# Patient Record
Sex: MALE | Race: WHITE
[De-identification: names, ages, dates, MRNs, and addresses within clinical notes are randomized per-mention and may not be internally consistent; named-entity substitution may affect disease eponyms.]

---

## 2017-04-03 ENCOUNTER — HOSPITAL ENCOUNTER (EMERGENCY)
Dept: HOSPITAL 15 - ER | Age: 52
LOS: 1 days | Discharge: HOME | End: 2017-04-04
Payer: MEDICAID

## 2017-04-03 VITALS — DIASTOLIC BLOOD PRESSURE: 80 MMHG | SYSTOLIC BLOOD PRESSURE: 116 MMHG

## 2017-04-03 VITALS — HEIGHT: 67 IN | BODY MASS INDEX: 21.97 KG/M2 | WEIGHT: 140 LBS

## 2017-04-03 DIAGNOSIS — M25.561: ICD-10-CM

## 2017-04-03 DIAGNOSIS — G89.29: Primary | ICD-10-CM

## 2017-04-03 DIAGNOSIS — F17.210: ICD-10-CM

## 2017-04-03 DIAGNOSIS — M54.5: ICD-10-CM

## 2017-04-03 PROCEDURE — 96372 THER/PROPH/DIAG INJ SC/IM: CPT

## 2017-04-03 PROCEDURE — 99284 EMERGENCY DEPT VISIT MOD MDM: CPT

## 2017-04-03 PROCEDURE — 73562 X-RAY EXAM OF KNEE 3: CPT

## 2017-04-03 PROCEDURE — 72100 X-RAY EXAM L-S SPINE 2/3 VWS: CPT

## 2017-05-13 ENCOUNTER — HOSPITAL ENCOUNTER (OUTPATIENT)
Dept: HOSPITAL 15 - ER | Age: 52
Setting detail: OBSERVATION
Discharge: HOME | DRG: 52 | End: 2017-05-13
Attending: EMERGENCY MEDICINE | Admitting: EMERGENCY MEDICINE
Payer: MEDICAID

## 2017-05-13 VITALS — WEIGHT: 140 LBS | BODY MASS INDEX: 21.22 KG/M2 | HEIGHT: 68 IN

## 2017-05-13 VITALS — DIASTOLIC BLOOD PRESSURE: 77 MMHG | SYSTOLIC BLOOD PRESSURE: 120 MMHG

## 2017-05-13 DIAGNOSIS — F10.120: ICD-10-CM

## 2017-05-13 DIAGNOSIS — F17.210: ICD-10-CM

## 2017-05-13 DIAGNOSIS — G92: Primary | ICD-10-CM

## 2017-05-13 DIAGNOSIS — T54.0X1A: ICD-10-CM
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
## 2017-05-13 LAB
ALBUMIN SERPL-MCNC: 4 G/DL (ref 3.4–5)
ALP SERPL-CCNC: 75 U/L (ref 45–117)
ANION GAP SERPL CALCULATED.3IONS-SCNC: 13 MMOL/L (ref 5–15)
APAP SERPL-MCNC: 41.9 UG/ML (ref 10–30)
BILIRUB SERPL-MCNC: 0.5 MG/DL (ref 0.2–1)
BUN SERPL-MCNC: 11 MG/DL (ref 7–18)
BUN/CREAT SERPL: 15.3
CALCIUM SERPL-MCNC: 8.3 MG/DL (ref 8.5–10.1)
CHLORIDE SERPL-SCNC: 105 MMOL/L (ref 98–107)
CO2 SERPL-SCNC: 22 MMOL/L (ref 21–32)
GLUCOSE SERPL-MCNC: 103 MG/DL (ref 74–106)
HCT VFR BLD AUTO: 47.5 % (ref 41–53)
HGB BLD-MCNC: 15.5 G/DL (ref 13.5–17.5)
MCH RBC QN AUTO: 28.6 PG (ref 28–32)
MCV RBC AUTO: 88.1 FL (ref 80–100)
POTASSIUM SERPL-SCNC: 3.6 MMOL/L (ref 3.5–5.1)
PROT SERPL-MCNC: 7.6 G/DL (ref 6.4–8.2)
SALICYLATES SERPL-MCNC: 2.1 MG/DL (ref 2.8–20)
SODIUM SERPL-SCNC: 140 MMOL/L (ref 136–145)

## 2017-05-13 PROCEDURE — 80053 COMPREHEN METABOLIC PANEL: CPT

## 2017-05-13 PROCEDURE — 85025 COMPLETE CBC W/AUTO DIFF WBC: CPT

## 2017-05-13 PROCEDURE — 36415 COLL VENOUS BLD VENIPUNCTURE: CPT

## 2017-05-13 PROCEDURE — 96361 HYDRATE IV INFUSION ADD-ON: CPT

## 2017-05-13 PROCEDURE — 99285 EMERGENCY DEPT VISIT HI MDM: CPT

## 2017-05-13 PROCEDURE — 80320 DRUG SCREEN QUANTALCOHOLS: CPT

## 2017-05-13 PROCEDURE — 93005 ELECTROCARDIOGRAM TRACING: CPT

## 2017-05-13 PROCEDURE — 94640 AIRWAY INHALATION TREATMENT: CPT

## 2017-05-13 PROCEDURE — 80329 ANALGESICS NON-OPIOID 1 OR 2: CPT

## 2017-05-13 PROCEDURE — 96365 THER/PROPH/DIAG IV INF INIT: CPT

## 2017-05-25 ENCOUNTER — HOSPITAL ENCOUNTER (EMERGENCY)
Dept: HOSPITAL 15 - ER | Age: 52
LOS: 1 days | Discharge: HOME | End: 2017-05-26
Payer: MEDICAID

## 2017-05-25 VITALS — BODY MASS INDEX: 21.97 KG/M2 | WEIGHT: 140 LBS | HEIGHT: 67 IN
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
## 2017-05-25 DIAGNOSIS — H92.02: ICD-10-CM
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
## 2017-05-25 DIAGNOSIS — S00.83XA: ICD-10-CM

## 2017-05-25 DIAGNOSIS — F17.210: ICD-10-CM

## 2017-05-25 DIAGNOSIS — F12.10: ICD-10-CM
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
## 2017-05-25 DIAGNOSIS — S13.9XXA: Primary | ICD-10-CM

## 2017-05-25 PROCEDURE — 70450 CT HEAD/BRAIN W/O DYE: CPT

## 2017-05-25 PROCEDURE — 70486 CT MAXILLOFACIAL W/O DYE: CPT

## 2017-05-25 PROCEDURE — 72125 CT NECK SPINE W/O DYE: CPT

## 2017-05-26 VITALS — SYSTOLIC BLOOD PRESSURE: 127 MMHG | DIASTOLIC BLOOD PRESSURE: 87 MMHG

## 2017-12-16 ENCOUNTER — HOSPITAL ENCOUNTER (EMERGENCY)
Dept: HOSPITAL 15 - ER | Age: 52
Discharge: LEFT BEFORE DISCHARGE | End: 2017-12-16
Payer: MEDICAID

## 2017-12-16 VITALS — DIASTOLIC BLOOD PRESSURE: 87 MMHG | SYSTOLIC BLOOD PRESSURE: 134 MMHG

## 2017-12-16 VITALS — BODY MASS INDEX: 21.19 KG/M2 | WEIGHT: 135 LBS | HEIGHT: 67 IN

## 2017-12-16 DIAGNOSIS — K64.9: ICD-10-CM

## 2017-12-16 DIAGNOSIS — F17.210: ICD-10-CM

## 2017-12-16 DIAGNOSIS — K29.71: Primary | ICD-10-CM

## 2017-12-16 LAB
ALBUMIN SERPL-MCNC: 4 G/DL (ref 3.4–5)
ALP SERPL-CCNC: 70 U/L (ref 45–117)
ANION GAP SERPL CALCULATED.3IONS-SCNC: 10 MMOL/L (ref 5–15)
BILIRUB SERPL-MCNC: 0.3 MG/DL (ref 0.2–1)
BUN SERPL-MCNC: 11 MG/DL (ref 7–18)
BUN/CREAT SERPL: 15.9
CALCIUM SERPL-MCNC: 8.4 MG/DL (ref 8.5–10.1)
CHLORIDE SERPL-SCNC: 109 MMOL/L (ref 98–107)
CO2 SERPL-SCNC: 20 MMOL/L (ref 21–32)
GLUCOSE SERPL-MCNC: 76 MG/DL (ref 74–106)
HCT VFR BLD AUTO: 50.1 % (ref 41–53)
HGB BLD-MCNC: 16.6 G/DL (ref 13.5–17.5)
MCH RBC QN AUTO: 30.3 PG (ref 28–32)
MCV RBC AUTO: 91.3 FL (ref 80–100)
NRBC BLD QL AUTO: 0.1 %
POTASSIUM SERPL-SCNC: 4 MMOL/L (ref 3.5–5.1)
PROT SERPL-MCNC: 8.1 G/DL (ref 6.4–8.2)
SODIUM SERPL-SCNC: 139 MMOL/L (ref 136–145)

## 2017-12-16 PROCEDURE — 85025 COMPLETE CBC W/AUTO DIFF WBC: CPT

## 2017-12-16 PROCEDURE — 80320 DRUG SCREEN QUANTALCOHOLS: CPT

## 2017-12-16 PROCEDURE — 36415 COLL VENOUS BLD VENIPUNCTURE: CPT

## 2017-12-16 PROCEDURE — 80053 COMPREHEN METABOLIC PANEL: CPT

## 2018-09-26 ENCOUNTER — HOSPITAL ENCOUNTER (EMERGENCY)
Dept: HOSPITAL 15 - ER | Age: 53
Discharge: HOME | End: 2018-09-26
Payer: MEDICAID

## 2018-09-26 VITALS — HEIGHT: 67 IN | WEIGHT: 140 LBS | BODY MASS INDEX: 21.97 KG/M2

## 2018-09-26 VITALS — SYSTOLIC BLOOD PRESSURE: 122 MMHG | DIASTOLIC BLOOD PRESSURE: 69 MMHG

## 2018-09-26 DIAGNOSIS — F12.10: ICD-10-CM

## 2018-09-26 DIAGNOSIS — F17.210: ICD-10-CM

## 2018-09-26 DIAGNOSIS — R07.89: Primary | ICD-10-CM

## 2018-09-26 DIAGNOSIS — F32.9: ICD-10-CM

## 2018-09-26 LAB
ALBUMIN SERPL-MCNC: 3.3 G/DL (ref 3.4–5)
ALP SERPL-CCNC: 99 U/L (ref 45–117)
ALT SERPL-CCNC: 32 U/L (ref 16–61)
ANION GAP SERPL CALCULATED.3IONS-SCNC: 5 MMOL/L (ref 5–15)
BILIRUB SERPL-MCNC: 0.3 MG/DL (ref 0.2–1)
BUN SERPL-MCNC: 11 MG/DL (ref 7–18)
BUN/CREAT SERPL: 12.9
CALCIUM SERPL-MCNC: 8.5 MG/DL (ref 8.5–10.1)
CHLORIDE SERPL-SCNC: 107 MMOL/L (ref 98–107)
CO2 SERPL-SCNC: 27 MMOL/L (ref 21–32)
GLUCOSE SERPL-MCNC: 103 MG/DL (ref 74–106)
HCT VFR BLD AUTO: 47.1 % (ref 41–53)
HGB BLD-MCNC: 16 G/DL (ref 13.5–17.5)
MAGNESIUM SERPL-MCNC: 2.7 MG/DL (ref 1.6–2.6)
MCH RBC QN AUTO: 30.8 PG (ref 28–32)
MCV RBC AUTO: 90.6 FL (ref 80–100)
NRBC BLD QL AUTO: 0 %
POTASSIUM SERPL-SCNC: 4 MMOL/L (ref 3.5–5.1)
PROT SERPL-MCNC: 7.8 G/DL (ref 6.4–8.2)
SODIUM SERPL-SCNC: 139 MMOL/L (ref 136–145)

## 2018-09-26 PROCEDURE — 71045 X-RAY EXAM CHEST 1 VIEW: CPT

## 2018-09-26 PROCEDURE — 94761 N-INVAS EAR/PLS OXIMETRY MLT: CPT

## 2018-09-26 PROCEDURE — 83735 ASSAY OF MAGNESIUM: CPT

## 2018-09-26 PROCEDURE — 36415 COLL VENOUS BLD VENIPUNCTURE: CPT

## 2018-09-26 PROCEDURE — 85025 COMPLETE CBC W/AUTO DIFF WBC: CPT

## 2018-09-26 PROCEDURE — 84484 ASSAY OF TROPONIN QUANT: CPT

## 2018-09-26 PROCEDURE — 80053 COMPREHEN METABOLIC PANEL: CPT

## 2018-09-26 PROCEDURE — 82962 GLUCOSE BLOOD TEST: CPT

## 2018-09-26 PROCEDURE — 93005 ELECTROCARDIOGRAM TRACING: CPT

## 2018-12-17 ENCOUNTER — OFFICE VISIT (OUTPATIENT)
Dept: FAMILY MEDICINE | Facility: CLINIC | Age: 53
End: 2018-12-17
Payer: COMMERCIAL

## 2018-12-17 ENCOUNTER — HOSPITAL ENCOUNTER (EMERGENCY)
Facility: CLINIC | Age: 53
Discharge: HOME | End: 2018-12-17
Attending: EMERGENCY MEDICINE | Admitting: EMERGENCY MEDICINE
Payer: COMMERCIAL

## 2018-12-17 VITALS
DIASTOLIC BLOOD PRESSURE: 126 MMHG | SYSTOLIC BLOOD PRESSURE: 240 MMHG | RESPIRATION RATE: 14 BRPM | HEART RATE: 121 BPM | OXYGEN SATURATION: 99 % | BODY MASS INDEX: 31.26 KG/M2 | WEIGHT: 230.8 LBS | HEIGHT: 72 IN | TEMPERATURE: 99.3 F

## 2018-12-17 VITALS
TEMPERATURE: 98.6 F | RESPIRATION RATE: 13 BRPM | SYSTOLIC BLOOD PRESSURE: 216 MMHG | HEIGHT: 71 IN | HEART RATE: 106 BPM | DIASTOLIC BLOOD PRESSURE: 126 MMHG | OXYGEN SATURATION: 96 % | WEIGHT: 230 LBS | BODY MASS INDEX: 32.2 KG/M2

## 2018-12-17 DIAGNOSIS — I10 HYPERTENSION GOAL BP (BLOOD PRESSURE) < 140/90: ICD-10-CM

## 2018-12-17 DIAGNOSIS — I16.0 HYPERTENSIVE URGENCY: Primary | ICD-10-CM

## 2018-12-17 DIAGNOSIS — I10 HYPERTENSION, UNSPECIFIED TYPE: ICD-10-CM

## 2018-12-17 DIAGNOSIS — R22.9 MASS OF SKIN, SUPERFICIAL, LOCALIZED: ICD-10-CM

## 2018-12-17 LAB
ALBUMIN SERPL-MCNC: 4.3 G/DL (ref 3.4–5)
ALBUMIN UR-MCNC: 30 MG/DL
ALP SERPL-CCNC: 109 U/L (ref 40–150)
ALT SERPL W P-5'-P-CCNC: 25 U/L (ref 0–70)
ANION GAP SERPL CALCULATED.3IONS-SCNC: 6 MMOL/L (ref 3–14)
APPEARANCE UR: CLEAR
AST SERPL W P-5'-P-CCNC: 24 U/L (ref 0–45)
BASOPHILS # BLD AUTO: 0 10E9/L (ref 0–0.2)
BASOPHILS NFR BLD AUTO: 0.7 %
BILIRUB SERPL-MCNC: 0.8 MG/DL (ref 0.2–1.3)
BILIRUB UR QL STRIP: NEGATIVE
BUN SERPL-MCNC: 13 MG/DL (ref 7–30)
CALCIUM SERPL-MCNC: 8.5 MG/DL (ref 8.5–10.1)
CHLORIDE SERPL-SCNC: 104 MMOL/L (ref 94–109)
CO2 SERPL-SCNC: 30 MMOL/L (ref 20–32)
COLOR UR AUTO: YELLOW
CREAT SERPL-MCNC: 0.97 MG/DL (ref 0.66–1.25)
DIFFERENTIAL METHOD BLD: ABNORMAL
EOSINOPHIL # BLD AUTO: 0.3 10E9/L (ref 0–0.7)
EOSINOPHIL NFR BLD AUTO: 5.2 %
ERYTHROCYTE [DISTWIDTH] IN BLOOD BY AUTOMATED COUNT: 12.6 % (ref 10–15)
GFR SERPL CREATININE-BSD FRML MDRD: 80 ML/MIN/1.7M2
GLUCOSE SERPL-MCNC: 99 MG/DL (ref 70–99)
GLUCOSE UR STRIP-MCNC: NEGATIVE MG/DL
HCT VFR BLD AUTO: 53.9 % (ref 40–53)
HGB BLD-MCNC: 17.9 G/DL (ref 13.3–17.7)
HGB UR QL STRIP: NEGATIVE
HYALINE CASTS #/AREA URNS LPF: 1 /LPF (ref 0–2)
IMM GRANULOCYTES # BLD: 0 10E9/L (ref 0–0.4)
IMM GRANULOCYTES NFR BLD: 0.2 %
KETONES UR STRIP-MCNC: NEGATIVE MG/DL
LEUKOCYTE ESTERASE UR QL STRIP: NEGATIVE
LYMPHOCYTES # BLD AUTO: 2.9 10E9/L (ref 0.8–5.3)
LYMPHOCYTES NFR BLD AUTO: 47 %
MCH RBC QN AUTO: 29 PG (ref 26.5–33)
MCHC RBC AUTO-ENTMCNC: 33.2 G/DL (ref 31.5–36.5)
MCV RBC AUTO: 87 FL (ref 78–100)
MONOCYTES # BLD AUTO: 0.5 10E9/L (ref 0–1.3)
MONOCYTES NFR BLD AUTO: 8.5 %
MUCOUS THREADS #/AREA URNS LPF: PRESENT /LPF
NEUTROPHILS # BLD AUTO: 2.3 10E9/L (ref 1.6–8.3)
NEUTROPHILS NFR BLD AUTO: 38.4 %
NITRATE UR QL: NEGATIVE
NRBC # BLD AUTO: 0 10*3/UL
NRBC BLD AUTO-RTO: 0 /100
PH UR STRIP: 7 PH (ref 5–7)
PLATELET # BLD AUTO: 233 10E9/L (ref 150–450)
POTASSIUM SERPL-SCNC: 3.6 MMOL/L (ref 3.4–5.3)
PROT SERPL-MCNC: 7.7 G/DL (ref 6.8–8.8)
RBC # BLD AUTO: 6.17 10E12/L (ref 4.4–5.9)
RBC #/AREA URNS AUTO: 1 /HPF (ref 0–2)
SODIUM SERPL-SCNC: 140 MMOL/L (ref 133–144)
SOURCE: ABNORMAL
SP GR UR STRIP: 1.02 (ref 1–1.03)
UROBILINOGEN UR STRIP-MCNC: 0 MG/DL (ref 0–2)
WBC # BLD AUTO: 6.1 10E9/L (ref 4–11)
WBC #/AREA URNS AUTO: 1 /HPF (ref 0–5)

## 2018-12-17 PROCEDURE — 80053 COMPREHEN METABOLIC PANEL: CPT | Performed by: EMERGENCY MEDICINE

## 2018-12-17 PROCEDURE — 99283 EMERGENCY DEPT VISIT LOW MDM: CPT | Mod: Z6 | Performed by: EMERGENCY MEDICINE

## 2018-12-17 PROCEDURE — 99283 EMERGENCY DEPT VISIT LOW MDM: CPT

## 2018-12-17 PROCEDURE — 81001 URINALYSIS AUTO W/SCOPE: CPT | Performed by: EMERGENCY MEDICINE

## 2018-12-17 PROCEDURE — 99205 OFFICE O/P NEW HI 60 MIN: CPT | Performed by: FAMILY MEDICINE

## 2018-12-17 PROCEDURE — 85025 COMPLETE CBC W/AUTO DIFF WBC: CPT | Performed by: EMERGENCY MEDICINE

## 2018-12-17 PROCEDURE — 25000132 ZZH RX MED GY IP 250 OP 250 PS 637: Performed by: EMERGENCY MEDICINE

## 2018-12-17 PROCEDURE — 93000 ELECTROCARDIOGRAM COMPLETE: CPT | Performed by: FAMILY MEDICINE

## 2018-12-17 RX ORDER — TRIAMTERENE AND HYDROCHLOROTHIAZIDE 37.5; 25 MG/1; MG/1
2 CAPSULE ORAL ONCE
Status: DISCONTINUED | OUTPATIENT
Start: 2018-12-17 | End: 2018-12-17 | Stop reason: CLARIF

## 2018-12-17 RX ORDER — TRIAMTERENE AND HYDROCHLOROTHIAZIDE 75; 50 MG/1; MG/1
1 TABLET ORAL ONCE
Status: DISCONTINUED | OUTPATIENT
Start: 2018-12-17 | End: 2018-12-17 | Stop reason: DRUGHIGH

## 2018-12-17 RX ORDER — TRIAMTERENE AND HYDROCHLOROTHIAZIDE 75; 50 MG/1; MG/1
1 TABLET ORAL ONCE
Status: COMPLETED | OUTPATIENT
Start: 2018-12-17 | End: 2018-12-17

## 2018-12-17 RX ORDER — METOPROLOL SUCCINATE 100 MG/1
100 TABLET, EXTENDED RELEASE ORAL DAILY
Qty: 30 TABLET | Refills: 0 | Status: SHIPPED | OUTPATIENT
Start: 2018-12-17 | End: 2019-01-02

## 2018-12-17 RX ORDER — TRIAMTERENE AND HYDROCHLOROTHIAZIDE 75; 50 MG/1; MG/1
1 TABLET ORAL DAILY
Qty: 30 TABLET | Refills: 0 | Status: SHIPPED | OUTPATIENT
Start: 2018-12-17 | End: 2019-01-02

## 2018-12-17 RX ADMIN — TRIAMTERENE AND HYDROCHLOROTHIAZIDE 1 TABLET: 75; 50 TABLET ORAL at 15:10

## 2018-12-17 ASSESSMENT — MIFFLIN-ST. JEOR
SCORE: 1921.96
SCORE: 1910.4

## 2018-12-17 NOTE — ED AVS SNAPSHOT
[facility] Emergency Department  [address] [facility] [address]  Phone:  [phone]  Fax:  [phone]                                    Nicholas Martinez   MRN: [id number]    Department:  [facility] Emergency Department   Date of Visit:  12/17/2018           After Visit Summary Signature Page    I have received my discharge instructions, and my questions have been answered. I have discussed any challenges I see with this plan with the nurse or doctor.    ..........................................................................................................................................  Patient/Patient Representative Signature      ..........................................................................................................................................  Patient Representative Print Name and Relationship to Patient    ..................................................               ................................................  Date                                   Time    ..........................................................................................................................................  Reviewed by Signature/Title    ...................................................              ..............................................  Date                                               Time          22EPIC Rev 08/18

## 2018-12-17 NOTE — PROGRESS NOTES
SUBJECTIVE:   Nicholas Martinez is a 53 year old male who presents to clinic today for the following health issues:  Chief Complaint   Patient presents with     Derm Problem     Pt here to have lump on back of head checked.       Lump on back of head      Duration: has had for about a yr or so (about few mm), gotten bigger past 3 wks    Description (location/character/radiation): back of head, right side; patient denies pain on the lump; feels decreased in size again the last few days.    Intensity:  No pain    Accompanying signs and symptoms: none    History (similar episodes/previous evaluation): None    Precipitating or alleviating factors: None    Therapies tried and outcome: None       Hypertension Follow-up      Outpatient blood pressures are not being checked.    Low Salt Diet: not monitoring salt     Patient reports he stopped taking meds 3-4 years ago after he changed insurance and just never bothered to refill it.    Denies chest pain, dyspnea, HA, BOV, dizziness or urinary changes.      Problem list and histories reviewed & adjusted, as indicated.  Additional history: as documented    Patient Active Problem List   Diagnosis     Hypertension goal BP (blood pressure) < 140/90     History reviewed. No pertinent surgical history.    Social History     Tobacco Use     Smoking status: Never Smoker     Smokeless tobacco: Never Used   Substance Use Topics     Alcohol use: Yes     Comment: occ     Family History   Problem Relation Age of Onset     Cancer Mother 58        liver CA     Hypertension Mother      Hypertension Father      Cerebrovascular Disease Paternal Grandmother      Asthma No family hx of      C.A.D. No family hx of      Diabetes No family hx of      Prostate Cancer No family hx of      Cancer - colorectal No family hx of          Current Outpatient Medications   Medication Sig Dispense Refill     metoprolol (TOPROL-XL) 100 MG 24 hr tablet Take 2 tablets (200 mg) by mouth daily (Patient not taking:  "Reported on 12/17/2018) 180 tablet 3     triamterene-hydrochlorothiazide (MAXZIDE) 75-50 MG per tablet Take 1 tablet by mouth daily (Patient not taking: Reported on 12/17/2018) 90 tablet 3     No Known Allergies  BP Readings from Last 3 Encounters:   12/17/18 (!) 242/144   12/17/18 (!) 240/126   11/10/14 (!) 175/106    Wt Readings from Last 3 Encounters:   12/17/18 104.3 kg (230 lb)   12/17/18 104.7 kg (230 lb 12.8 oz)   11/22/13 106.1 kg (234 lb)                    Reviewed and updated as needed this visit by clinical staff  Tobacco  Allergies  Meds  Med Hx  Surg Hx  Fam Hx  Soc Hx      Reviewed and updated as needed this visit by Provider         ROS:  C: NEGATIVE for fever, chills or change in weight  I: NEGATIVE for worrisome rashes, moles or lesions  E: NEGATIVE for vision changes or irritation  R: NEGATIVE for significant cough or SOB  CV: NEGATIVE for chest pain, palpitations or peripheral edema  GI: NEGATIVE for nausea, abdominal pain, heartburn, or change in bowel habits  : NEGATIVE for frequency, dysuria, or hematuria  M: NEGATIVE for significant arthralgias or myalgia  N: NEGATIVE for weakness, dizziness or paresthesias  E: NEGATIVE for temperature intolerance, skin/hair changes  H: NEGATIVE for bleeding problems  P: NEG for anxiety    OBJECTIVE:                                                    BP (!) 240/126   Pulse 121   Temp 99.3  F (37.4  C) (Tympanic)   Resp 14   Ht 1.816 m (5' 11.5\")   Wt 104.7 kg (230 lb 12.8 oz)   SpO2 99%   BMI 31.74 kg/m    Body mass index is 31.74 kg/m .  GENERAL:  alert and no distress, ambulatory w/o assist  EYES: no icterus; EOMI, PERRLA  NECK: no tenderness, no adenopathy,  Thyroid not enlarged  RESP: lungs clear to auscultation - no rales, no rhonchi, no wheezes  CV: tachycardic, regular rhythm, no murmur  MS: no edema  SKIN: no suspicious lesions, no rashes  NEURO: strength and tone- normal, sensory exam- grossly normal, mentation- intact, speech- normal, " reflexes- symmetric  ABD:  nontender    Diagnostic test results:  Diagnostic Test Results:  No results found for this or any previous visit (from the past 24 hour(s)).     EKG today showed sinus tachycardia with LVH by voltage criteria and non-specific ST depression that could be due to strain or LVH.     ASSESSMENT/PLAN:                                                        ICD-10-CM    1. Hypertensive urgency I16.0 Severely unctontrolled.  EKG 12-lead complete w/read - Clinics  Patient repeatedly said he has no ongoing symptoms.  Discussed with patient EKG findings, risks of his condition, available treatments, and complicating issues we are facing considering he has had no regular medical care for years.  Best to get his baseline tests today and rule out occult end-organ damage, and to determine best and most appropriate first line treatment.  Discussed risks of severely uncontrolled hypertension.   Discussed patient's case with Dr. Martinez, who concurred that patient will need initial testing before treatment. Hence, patient to be brought to ER.  Need to gradually decrease BP to prevent hypoperfusion as well.  Low salt diet.  Return precautions discussed and given to patient.     2. Mass of skin, superficial, localized R22.9 GENERAL SURG ADULT REFERRAL  Due to location, consult gen surg for possible removal.  No fluctuance.  Advised patient should control BP first before surgery would perform procedures usually.  He verbalized understanding.       Follow up with Provider - few days after discharge from ER/hospital.   Patient Instructions     Schedule general surgery clinic appointment for consult for possible removal of the lump on the right occipital area.    Due to severely elevated blood pressure, there is a high risk for developing stroke, heart attack, sudden loss of vision or even sudden death.  Your EKG already shows heart strain and possible thickening of the heart muscle.  To determine any end-organ  damage and to determine safely starting the appropriate medication, further testing is needed. This can be done in the ER.    Follow up and establish care with a regular primary provider that you prefer to see after discharge from the ER.  Patient Education     Discharge Instructions for Malignant Hypertension  Malignant hypertension is a medical emergency. It means you have dangerously high blood pressure. This means a top number usually higher than 180 or a bottom number higher than 120. This elevated blood pressure could result in damage to your heart, kidneys, brain, eyes, blood vessels, and other organs. Here s what you can do to help manage this condition.  Taking your blood pressure    Learn to take your own blood pressure.    Keep a record of your blood pressure results. Ask your doctor which readings mean that you need medical attention.    Have your blood pressure checked by your healthcare provider regularly.    If you have a blood pressure measure at home that is higher than 180/110 wait 2 minutes and take it again. If the second reading shows either number at or above the first reading, OR if you have any symptoms shown at the end of this page, get emergency medical care right away.  Taking medicines    Take your blood pressure medicine exactly as your doctor directed.     Learn the possible side effects of any prescribed medicines.    Tell your doctor about any medicine you are taking. Some medicines can cause malignant hypertension.    Don't take medicines that contain heart stimulants, including over-the-counter medicines. Check for warnings about high blood pressure on the label.    Check with your doctor before taking a decongestant. Some can worsen high blood pressure.  Lifestyle changes    Limit your activity until your blood pressure is controlled.    Cut back on salt.  ? Limit canned, dried, packaged, and fast foods.  ? Don t add salt to your food at the table.  ? Season foods with herbs  instead of salt when you cook.  ? Request foods at restaurants with no added salt.    Maintain a healthy weight. Get help to lose any extra pounds.    Begin an exercise program. Ask your doctor how to get started. You can benefit from simple activities like walking, gardening, swimming, or dancing.    Don t drink more than 1 alcoholic drink a day for women and 2 a day for men.    Limit drinks that contain caffeine (coffee, black or green tea, cola) to 2 per day.    Never take stimulants such as amphetamines or cocaine; these drugs can be deadly for someone with hypertension.    Control your stress. Learn stress-management techniques.  Follow-up care    Make a follow-up appointment with your doctor regularly.    Visit your doctor for blood pressure checks, dietary advice, and medicine adjustment.  Call 911  Call 911 if you have any of these:    Chest pain or shortness of breath    Seizure (with no history of seizure disorder)  When to call your healthcare provider  Call your healthcare provider right away if you have any of the following:    Moderate to severe headache    Weakness in the muscles of your face, arms, or legs    Trouble speaking    Extreme drowsiness or confusion    Restlessness, anxiety    Fainting or dizziness    Pulsating or rushing sound in your ears    Unexplained nosebleed    Weakness, tingling, or numbness of your face, arms, or legs    Change in vision (including blurred vision)    Unusual tiredness    Nausea or vomiting    Decreased urine output    Blood pressure reading measured at home that is higher than 180/110   Date Last Reviewed: 4/27/2016 2000-2018 [employer]. [address] [place] [address]. All rights reserved. This information is not intended as a substitute for professional medical care. Always follow your healthcare professional's instructions.               Dejuan Cintron MD  [facility]

## 2018-12-17 NOTE — DISCHARGE INSTRUCTIONS
Restart your antihypertensive medications.  Return to the emergency department if you have chest pain, confusion, severe headache, abdominal pain, or other concerns.  Otherwise follow-up in clinic.

## 2018-12-17 NOTE — ED PROVIDER NOTES
History     Chief Complaint   Patient presents with     Hypertension     sent from clinic for HTN     HPI  Nicholas Martinez is a 53 year old male who presents from a clinic for hypertension.  The patient states that he was in clinic today due to a lump on the back of his head that is gotten bigger.  Nonpainful, no fevers, no chills, no discharge from this lump.  Told that he needed to follow-up in dermatology clinic.  In his clinic appointment today he was noted to have hypertension and was sent here for further evaluation.  The patient says he feels fine.  He denies headache, confusion, vision changes, nausea, vomiting, chest pain, difficulty breathing, abdominal pain, or focal numbness or weakness.    Problem List:    Patient Active Problem List    Diagnosis Date Noted     Hypertension goal BP (blood pressure) < 140/90 10/25/2011     Priority: Medium        Past Medical History:    No past medical history on file.    Past Surgical History:    No past surgical history on file.    Family History:    Family History   Problem Relation Age of Onset     Cancer Mother 58        liver CA     Hypertension Mother      Hypertension Father      Cerebrovascular Disease Paternal Grandmother      Asthma No family hx of      C.A.D. No family hx of      Diabetes No family hx of      Prostate Cancer No family hx of      Cancer - colorectal No family hx of        Social History:  Marital Status:  Single [1]  Social History     Tobacco Use     Smoking status: Never Smoker     Smokeless tobacco: Never Used   Substance Use Topics     Alcohol use: Yes     Comment: occ     Drug use: No        Medications:      metoprolol succinate ER (TOPROL-XL) 100 MG 24 hr tablet   triamterene-HCTZ (MAXZIDE) 75-50 MG tablet         Review of Systems  Pertinent positives and negatives listed in the HPI, all other systems reviewed and are negative.    Physical Exam   BP: (!) 242/144  Pulse: 106  Heart Rate: 106  Temp: 98.6  F (37  C)  Resp: 16  Height:  "180.3 cm (5' 11\")  Weight: 104.3 kg (230 lb)  SpO2: 97 %      Physical Exam   Constitutional: He is oriented to person, place, and time. He appears well-developed and well-nourished. He appears distressed.   HENT:   Head: Normocephalic and atraumatic.   Right Ear: External ear normal.   Left Ear: External ear normal.   Nose: Nose normal.   Eyes: Conjunctivae are normal. No scleral icterus.   Neck: Normal range of motion.   Cardiovascular: Normal rate and regular rhythm.   No murmur heard.  Pulmonary/Chest: Effort normal. No stridor. No respiratory distress. He has no wheezes.   Abdominal: Soft. He exhibits no distension.   Neurological: He is alert and oriented to person, place, and time.   Skin: Skin is warm and dry. He is not diaphoretic.   Psychiatric: He has a normal mood and affect. His behavior is normal.   Nursing note and vitals reviewed.      ED Course        Procedures               Critical Care time:  none               Results for orders placed or performed during the hospital encounter of 12/17/18 (from the past 24 hour(s))   CBC with platelets differential   Result Value Ref Range    WBC 6.1 4.0 - 11.0 10e9/L    RBC Count 6.17 (H) 4.4 - 5.9 10e12/L    Hemoglobin 17.9 (H) 13.3 - 17.7 g/dL    Hematocrit 53.9 (H) 40.0 - 53.0 %    MCV 87 78 - 100 fl    MCH 29.0 26.5 - 33.0 pg    MCHC 33.2 31.5 - 36.5 g/dL    RDW 12.6 10.0 - 15.0 %    Platelet Count 233 150 - 450 10e9/L    Diff Method Automated Method     % Neutrophils 38.4 %    % Lymphocytes 47.0 %    % Monocytes 8.5 %    % Eosinophils 5.2 %    % Basophils 0.7 %    % Immature Granulocytes 0.2 %    Nucleated RBCs 0 0 /100    Absolute Neutrophil 2.3 1.6 - 8.3 10e9/L    Absolute Lymphocytes 2.9 0.8 - 5.3 10e9/L    Absolute Monocytes 0.5 0.0 - 1.3 10e9/L    Absolute Eosinophils 0.3 0.0 - 0.7 10e9/L    Absolute Basophils 0.0 0.0 - 0.2 10e9/L    Abs Immature Granulocytes 0.0 0 - 0.4 10e9/L    Absolute Nucleated RBC 0.0    Comprehensive metabolic panel   Result " Value Ref Range    Sodium 140 133 - 144 mmol/L    Potassium 3.6 3.4 - 5.3 mmol/L    Chloride 104 94 - 109 mmol/L    Carbon Dioxide 30 20 - 32 mmol/L    Anion Gap 6 3 - 14 mmol/L    Glucose 99 70 - 99 mg/dL    Urea Nitrogen 13 7 - 30 mg/dL    Creatinine 0.97 0.66 - 1.25 mg/dL    GFR Estimate 80 >60 mL/min/1.7m2    GFR Estimate If Black >90 >60 mL/min/1.7m2    Calcium 8.5 8.5 - 10.1 mg/dL    Bilirubin Total 0.8 0.2 - 1.3 mg/dL    Albumin 4.3 3.4 - 5.0 g/dL    Protein Total 7.7 6.8 - 8.8 g/dL    Alkaline Phosphatase 109 40 - 150 U/L    ALT 25 0 - 70 U/L    AST 24 0 - 45 U/L   UA reflex to Microscopic   Result Value Ref Range    Color Urine Yellow     Appearance Urine Clear     Glucose Urine Negative NEG^Negative mg/dL    Bilirubin Urine Negative NEG^Negative    Ketones Urine Negative NEG^Negative mg/dL    Specific Gravity Urine 1.016 1.003 - 1.035    Blood Urine Negative NEG^Negative    pH Urine 7.0 5.0 - 7.0 pH    Protein Albumin Urine 30 (A) NEG^Negative mg/dL    Urobilinogen mg/dL 0.0 0.0 - 2.0 mg/dL    Nitrite Urine Negative NEG^Negative    Leukocyte Esterase Urine Negative NEG^Negative    Source Midstream Urine     RBC Urine 1 0 - 2 /HPF    WBC Urine 1 0 - 5 /HPF    Mucous Urine Present (A) NEG^Negative /LPF    Hyaline Casts 1 0 - 2 /LPF       Medications   triamterene-HCTZ (MAXZIDE) 75-50 MG per tablet 1 tablet (1 tablet Oral Given 12/17/18 1510)       Assessments & Plan (with Medical Decision Making)   53-year-old male who presents for hypertension.  Blood pressure 216/126, heart rate 98, temperature is 98.6 F, SPO2 is 96% on room air.  The patient is asymptomatic, no signs of aortic dissection, intracranial hemorrhage, stroke, hypertensive encephalopathy, or other hypertensive emergencies.  Therefore acutely lowering his blood pressure is contraindicated at this time.  He does have some protein in his urine, electrodes are within normal limits, hemoglobin is normal.  He is safe to discharge with instructions to  follow-up in clinic, I have arranged follow-up for him.  He is given 1 dose of his triamterine-hydrochlorothiazide here.  I have sent refill prescriptions of his previous hypertensive medications to the pharmacy of his choice, however I have decreased the metoprolol from 200 mg daily to 100 mg daily to avoid lowering him to quickly.  He is told to return if he has chest pain or worsening symptoms or other concerns.  The patient is in agreement with this plan.    I have reviewed the nursing notes.    I have reviewed the findings, diagnosis, plan and need for follow up with the patient.          Medication List      Modified    metoprolol succinate  MG 24 hr tablet  Commonly known as:  TOPROL-XL  100 mg, Oral, DAILY  What changed:  how much to take            Final diagnoses:   Hypertension, unspecified type       12/17/2018   [facility] EMERGENCY DEPARTMENT     Jayson Canas MD  12/17/18 [id number]

## 2018-12-17 NOTE — PATIENT INSTRUCTIONS
Schedule general surgery clinic appointment for consult for possible removal of the lump on the right occipital area.    Due to severely elevated blood pressure, there is a high risk for developing stroke, heart attack, sudden loss of vision or even sudden death.  Your EKG already shows heart strain and possible thickening of the heart muscle.  To determine any end-organ damage and to determine safely starting the appropriate medication, further testing is needed. This can be done in the ER.    Follow up and establish care with a regular primary provider that you prefer to see after discharge from the ER.  Patient Education     Discharge Instructions for Malignant Hypertension  Malignant hypertension is a medical emergency. It means you have dangerously high blood pressure. This means a top number usually higher than 180 or a bottom number higher than 120. This elevated blood pressure could result in damage to your heart, kidneys, brain, eyes, blood vessels, and other organs. Here s what you can do to help manage this condition.  Taking your blood pressure    Learn to take your own blood pressure.    Keep a record of your blood pressure results. Ask your doctor which readings mean that you need medical attention.    Have your blood pressure checked by your healthcare provider regularly.    If you have a blood pressure measure at home that is higher than 180/110 wait 2 minutes and take it again. If the second reading shows either number at or above the first reading, OR if you have any symptoms shown at the end of this page, get emergency medical care right away.  Taking medicines    Take your blood pressure medicine exactly as your doctor directed.     Learn the possible side effects of any prescribed medicines.    Tell your doctor about any medicine you are taking. Some medicines can cause malignant hypertension.    Don't take medicines that contain heart stimulants, including over-the-counter medicines. Check for  warnings about high blood pressure on the label.    Check with your doctor before taking a decongestant. Some can worsen high blood pressure.  Lifestyle changes    Limit your activity until your blood pressure is controlled.    Cut back on salt.  ? Limit canned, dried, packaged, and fast foods.  ? Don t add salt to your food at the table.  ? Season foods with herbs instead of salt when you cook.  ? Request foods at restaurants with no added salt.    Maintain a healthy weight. Get help to lose any extra pounds.    Begin an exercise program. Ask your doctor how to get started. You can benefit from simple activities like walking, gardening, swimming, or dancing.    Don t drink more than 1 alcoholic drink a day for women and 2 a day for men.    Limit drinks that contain caffeine (coffee, black or green tea, cola) to 2 per day.    Never take stimulants such as amphetamines or cocaine; these drugs can be deadly for someone with hypertension.    Control your stress. Learn stress-management techniques.  Follow-up care    Make a follow-up appointment with your doctor regularly.    Visit your doctor for blood pressure checks, dietary advice, and medicine adjustment.  Call 911  Call 911 if you have any of these:    Chest pain or shortness of breath    Seizure (with no history of seizure disorder)  When to call your healthcare provider  Call your healthcare provider right away if you have any of the following:    Moderate to severe headache    Weakness in the muscles of your face, arms, or legs    Trouble speaking    Extreme drowsiness or confusion    Restlessness, anxiety    Fainting or dizziness    Pulsating or rushing sound in your ears    Unexplained nosebleed    Weakness, tingling, or numbness of your face, arms, or legs    Change in vision (including blurred vision)    Unusual tiredness    Nausea or vomiting    Decreased urine output    Blood pressure reading measured at home that is higher than 180/110   Date Last  Reviewed: 4/27/2016 2000-2018 [employer]. [address] [place] [address], [place] [address]. All rights reserved. This information is not intended as a substitute for professional medical care. Always follow your healthcare professional's instructions.

## 2019-01-02 ENCOUNTER — OFFICE VISIT (OUTPATIENT)
Dept: FAMILY MEDICINE | Facility: CLINIC | Age: 54
End: 2019-01-02
Payer: COMMERCIAL

## 2019-01-02 VITALS
OXYGEN SATURATION: 97 % | HEART RATE: 109 BPM | SYSTOLIC BLOOD PRESSURE: 178 MMHG | TEMPERATURE: 98.3 F | HEIGHT: 72 IN | DIASTOLIC BLOOD PRESSURE: 94 MMHG | BODY MASS INDEX: 31.18 KG/M2 | RESPIRATION RATE: 14 BRPM | WEIGHT: 230.2 LBS

## 2019-01-02 DIAGNOSIS — I10 UNCONTROLLED HYPERTENSION: Primary | ICD-10-CM

## 2019-01-02 DIAGNOSIS — Z12.11 SCREENING FOR MALIGNANT NEOPLASM OF COLON: ICD-10-CM

## 2019-01-02 DIAGNOSIS — Z23 NEED FOR PROPHYLACTIC VACCINATION AND INOCULATION AGAINST INFLUENZA: ICD-10-CM

## 2019-01-02 DIAGNOSIS — I51.7 LEFT VENTRICULAR HYPERTROPHY BY ELECTROCARDIOGRAM: ICD-10-CM

## 2019-01-02 PROCEDURE — 90682 RIV4 VACC RECOMBINANT DNA IM: CPT | Performed by: FAMILY MEDICINE

## 2019-01-02 PROCEDURE — 99214 OFFICE O/P EST MOD 30 MIN: CPT | Mod: 25 | Performed by: FAMILY MEDICINE

## 2019-01-02 PROCEDURE — 90471 IMMUNIZATION ADMIN: CPT | Performed by: FAMILY MEDICINE

## 2019-01-02 RX ORDER — METOPROLOL SUCCINATE 200 MG/1
200 TABLET, EXTENDED RELEASE ORAL DAILY
Qty: 30 TABLET | Refills: 0 | Status: SHIPPED | OUTPATIENT
Start: 2019-01-02 | End: 2019-02-11

## 2019-01-02 RX ORDER — TRIAMTERENE AND HYDROCHLOROTHIAZIDE 75; 50 MG/1; MG/1
1 TABLET ORAL DAILY
Qty: 90 TABLET | Refills: 3 | Status: ON HOLD | OUTPATIENT
Start: 2019-01-02 | End: 2022-09-22

## 2019-01-02 ASSESSMENT — MIFFLIN-ST. JEOR: SCORE: 1919.24

## 2019-01-02 NOTE — PATIENT INSTRUCTIONS
Increase METOPROLOL Xl 100 MG to TOTAL  MG DAILY.    Keep Maxzide at the previously prescribed dose.    Be consistent with sodium restrictions.    Contact Cardiac Services  at [phone], to schedule echocardiogram appointment.    Follow up with Dr. Cintron after the echocardiogram is done.    Schedule colonoscopy for screening for colon cancer at your soonest convenience.    Thank you for choosing [facility].  You may be receiving a survey in the mail from [employer] regarding your visit today.  Please take a few minutes to complete and return the survey to let us know how we are doing.      If you have questions or concerns, please contact us via [email or web address] or you can contact your care team at [phone].    Our Clinic hours are:  Monday 6:40 am  to 7:00 pm  Tuesday -Friday 6:40 am to 5:00 pm    The [place] outpatient lab hours are:  Monday - Friday 6:10 am to 4:45 pm  Saturdays 7:00 am to 11:00 am  Appointments are required, call [phone]    If you have clinical questions after hours or would like to schedule an appointment,  call the clinic at [phone].    Patient Education     Established High Blood Pressure    High blood pressure (hypertension) is a chronic disease. Often, healthcare providers don t know what causes it. But it can be caused by certain health conditions and medicines.  If you have high blood pressure, you may not have any symptoms. If you do have symptoms, they may include headache, dizziness, changes in your vision, chest pain, and shortness of breath. But even without symptoms, high blood pressure that s not treated raises your risk for heart attack, heart failure, and stroke. High blood pressure is a serious health risk and shouldn t be ignored.  Blood pressure measurements are given as 2 numbers. Systolic blood pressure is the upper number. This is the pressure when the heart contracts. Diastolic blood pressure is the lower number. This is the pressure when the  heart relaxes between beats. You will see your blood pressure readings written together. For example, a person with a systolic pressure of 118 and a diastolic pressure of 78 will have 118/78 written in the medical record.  Blood pressure is categorized as normal, elevated, or stage 1 or stage 2 high blood pressure:    Normal blood pressure is systolic of less than 120 and diastolic of less than 80 (120/80)    Elevated blood pressure is systolic of 120 to 129 and diastolic less than 80    Stage 1 high blood pressure is systolic is 130 to 139 or diastolic between 80 to 89    Stage 2 high blood pressure is when systolic is 140 or higher or the diastolic is 90 or higher  Home care  If you have high blood pressure, follow these home care guidelines to help lower your blood pressure. If you are taking medicines for high blood pressure, these methods may reduce or end your need for medicines in the future.    Start a weight-loss program if you are overweight.    Cut back on how much salt you get in your diet. Here s how to do this:  ? Don t eat foods that have a lot of salt. These include olives, pickles, smoked meats, and salted potato chips.  ? Don t add salt to your food at the table.  ? Use only small amounts of salt when cooking.    Start an exercise program. Talk with your healthcare provider about the type of exercise program that would be best for you. It doesn't have to be hard. Even brisk walking for 20 minutes 3 times a week is a good form of exercise.    Don t take medicines that stimulate the heart. This includes many over-the-counter cold and sinus decongestant pills and sprays, as well as diet pills. Check the warnings about high blood pressure on the label. Before buying any over-the-counter medicines or supplements, always ask the pharmacist about the product's potential interaction with your high blood pressure and your high blood pressure medicines.    Stimulants such as amphetamine or cocaine could be  deadly for someone with high blood pressure. Never take these.    Limit how much caffeine you get in your diet. Switch to caffeine-free products.    Stop smoking. If you are a long-time smoker, this can be hard. Talk to your healthcare provider about medicines and nicotine replacement options to help you. Also, enroll in a stop-smoking program to make it more likely that you will quit for good.    Learn how to handle stress. This is an important part of any program to lower blood pressure. Learn about relaxation methods like meditation, yoga, or biofeedback.    If your provider prescribed medicines, take them exactly as directed. Missing doses may cause your blood pressure get out of control.    If you miss a dose or doses, check with your healthcare provider or pharmacist about what to do.    Consider buying an automatic blood pressure machine to check your blood pressure at home. Ask your provider for a recommendation. You can get one of these at most pharmacies.     The American Heart Association recommends the following guidelines for home blood pressure monitoring:    Don't smoke or drink coffee for 30 minutes before taking your blood pressure.    Go to the bathroom before the test.    Relax for 5 minutes before taking the measurement.    Sit with your back supported (don't sit on a couch or soft chair); keep your feet on the floor uncrossed. Place your arm on a solid flat surface (like a table) with the upper part of the arm at heart level. Place the middle of the cuff directly above the bend of the elbow. Check the monitor's instruction manual for an illustration.    Take multiple readings. When you measure, take 2 to 3 readings one minute apart and record all of the results.    Take your blood pressure at the same time every day, or as your healthcare provider recommends.    Record the date, time, and blood pressure reading.    Take the record with you to your next medical appointment. If your blood pressure  monitor has a built-in memory, simply take the monitor with you to your next appointment.    Call your provider if you have several high readings. Don't be frightened by a single high blood pressure reading, but if you get several high readings, check in with your healthcare provider.    Note: When blood pressure reaches a systolic (top number) of 180 or higher OR diastolic (bottom number) of 110 or higher, seek emergency medical treatment.  Follow-up care  You will need to see your healthcare provider regularly. This is to check your blood pressure and to make changes to your medicines. Make a follow-up appointment as directed. Bring the record of your home blood pressure readings to the appointment.  When to seek medical advice  Call your healthcare provider right away if any of these occur:    Blood pressure reaches a systolic (upper number) of 180 or higher OR a diastolic (bottom number) of 110 or higher    Chest pain or shortness of breath    Severe headache    Throbbing or rushing sound in the ears    Nosebleed    Sudden severe pain in your belly (abdomen)    Extreme drowsiness, confusion, or fainting    Dizziness or spinning sensation (vertigo)    Weakness of an arm or leg or one side of the face    You have problems speaking or seeing   Date Last Reviewed: 12/1/2016 2000-2018 [employer]. [address] [place] [address], [place] [address]. All rights reserved. This information is not intended as a substitute for professional medical care. Always follow your healthcare professional's instructions.

## 2019-01-02 NOTE — PROGRESS NOTES
SUBJECTIVE:   Nicholas Martinez is a 53 year old male who presents to clinic today for the following health issues:      ED/UC Followup:    Facility:  [facility]  Date of visit: 12/17/18  Reason for visit: hypertension  Current Status: patient is not checking bp at home, he states he is not having any symptoms.  No headache, dizziness, vision changes or chest pain.  Patient reports he dos take Maxzide as prescribed.  EKG showed LVH by ST depression criteria but not by voltage.     Denies chest pain, dyspnea, HA, BOV, dizziness or urinary changes.    HM: due for colon cancer screening.    Problem list and histories reviewed & adjusted, as indicated.  Additional history: as documented    Patient Active Problem List   Diagnosis     Essential hypertension with goal blood pressure less than 130/80     History reviewed. No pertinent surgical history.    Social History     Tobacco Use     Smoking status: Never Smoker     Smokeless tobacco: Never Used   Substance Use Topics     Alcohol use: Yes     Comment: occ     Family History   Problem Relation Age of Onset     Cancer Mother 58        liver CA     Hypertension Mother      Hypertension Father      Cerebrovascular Disease Paternal Grandmother      Asthma No family hx of      C.A.D. No family hx of      Diabetes No family hx of      Prostate Cancer No family hx of      Cancer - colorectal No family hx of          Current Outpatient Medications   Medication Sig Dispense Refill     metoprolol succinate ER (TOPROL-XL) 200 MG 24 hr tablet Take 1 tablet (200 mg) by mouth daily 30 tablet 0     triamterene-HCTZ (MAXZIDE) 75-50 MG tablet Take 1 tablet by mouth daily 90 tablet 3     No Known Allergies  BP Readings from Last 3 Encounters:   01/02/19 (!) 178/94   12/17/18 (!) 216/126   12/17/18 (!) 240/126    Wt Readings from Last 3 Encounters:   01/02/19 104.4 kg (230 lb 3.2 oz)   12/17/18 104.3 kg (230 lb)   12/17/18 104.7 kg (230 lb 12.8 oz)          Reviewed and  "updated as needed this visit by clinical staff  Tobacco  Allergies  Meds  Problems  Med Hx  Surg Hx  Fam Hx  Soc Hx        Reviewed and updated as needed this visit by Provider  Tobacco  Allergies  Meds  Problems  Med Hx  Surg Hx  Fam Hx         ROS:  C: NEGATIVE for fever, chills, change in weight  I: NEGATIVE for worrisome rashes, moles or lesions  E: NEGATIVE for vision changes or irritation  R: NEGATIVE for significant cough or SOB  CV: NEGATIVE for chest pain, palpitations or peripheral edema  GI: NEGATIVE for nausea, abdominal pain, heartburn, or change in bowel habits  : NEGATIVE for frequency, dysuria, or hematuria  M: NEGATIVE for significant arthralgias or myalgia  N: NEGATIVE for weakness, dizziness or paresthesias  E: NEGATIVE for temperature intolerance, skin/hair changes  H: NEGATIVE for bleeding problems    OBJECTIVE:                                                    BP (!) 178/94   Pulse 109   Temp 98.3  F (36.8  C) (Tympanic)   Resp 14   Ht 1.816 m (5' 11.5\")   Wt 104.4 kg (230 lb 3.2 oz)   SpO2 97%   BMI 31.66 kg/m    Body mass index is 31.66 kg/m .  GENERAL:alert and no distress, ambulatory w/o assist  NECK: no tenderness, no adenopathy,  Thyroid not enlarged  RESP: lungs clear to auscultation - no rales, no rhonchi, no wheezes  CV: regular rates and rhythm, no murmur  MS: no bipedal edema  SKIN: no suspicious lesions, no rashes  NEURO: no tremors  ABD:  nontender    Diagnostic test results:  Diagnostic Test Results:  Results for orders placed or performed during the hospital encounter of 12/17/18   CBC with platelets differential   Result Value Ref Range    WBC 6.1 4.0 - 11.0 10e9/L    RBC Count 6.17 (H) 4.4 - 5.9 10e12/L    Hemoglobin 17.9 (H) 13.3 - 17.7 g/dL    Hematocrit 53.9 (H) 40.0 - 53.0 %    MCV 87 78 - 100 fl    MCH 29.0 26.5 - 33.0 pg    MCHC 33.2 31.5 - 36.5 g/dL    RDW 12.6 10.0 - 15.0 %    Platelet Count 233 150 - 450 10e9/L    Diff Method Automated Method  "    % Neutrophils 38.4 %    % Lymphocytes 47.0 %    % Monocytes 8.5 %    % Eosinophils 5.2 %    % Basophils 0.7 %    % Immature Granulocytes 0.2 %    Nucleated RBCs 0 0 /100    Absolute Neutrophil 2.3 1.6 - 8.3 10e9/L    Absolute Lymphocytes 2.9 0.8 - 5.3 10e9/L    Absolute Monocytes 0.5 0.0 - 1.3 10e9/L    Absolute Eosinophils 0.3 0.0 - 0.7 10e9/L    Absolute Basophils 0.0 0.0 - 0.2 10e9/L    Abs Immature Granulocytes 0.0 0 - 0.4 10e9/L    Absolute Nucleated RBC 0.0    Comprehensive metabolic panel   Result Value Ref Range    Sodium 140 133 - 144 mmol/L    Potassium 3.6 3.4 - 5.3 mmol/L    Chloride 104 94 - 109 mmol/L    Carbon Dioxide 30 20 - 32 mmol/L    Anion Gap 6 3 - 14 mmol/L    Glucose 99 70 - 99 mg/dL    Urea Nitrogen 13 7 - 30 mg/dL    Creatinine 0.97 0.66 - 1.25 mg/dL    GFR Estimate 80 >60 mL/min/1.7m2    GFR Estimate If Black >90 >60 mL/min/1.7m2    Calcium 8.5 8.5 - 10.1 mg/dL    Bilirubin Total 0.8 0.2 - 1.3 mg/dL    Albumin 4.3 3.4 - 5.0 g/dL    Protein Total 7.7 6.8 - 8.8 g/dL    Alkaline Phosphatase 109 40 - 150 U/L    ALT 25 0 - 70 U/L    AST 24 0 - 45 U/L   UA reflex to Microscopic   Result Value Ref Range    Color Urine Yellow     Appearance Urine Clear     Glucose Urine Negative NEG^Negative mg/dL    Bilirubin Urine Negative NEG^Negative    Ketones Urine Negative NEG^Negative mg/dL    Specific Gravity Urine 1.016 1.003 - 1.035    Blood Urine Negative NEG^Negative    pH Urine 7.0 5.0 - 7.0 pH    Protein Albumin Urine 30 (A) NEG^Negative mg/dL    Urobilinogen mg/dL 0.0 0.0 - 2.0 mg/dL    Nitrite Urine Negative NEG^Negative    Leukocyte Esterase Urine Negative NEG^Negative    Source Midstream Urine     RBC Urine 1 0 - 2 /HPF    WBC Urine 1 0 - 5 /HPF    Mucous Urine Present (A) NEG^Negative /LPF    Hyaline Casts 1 0 - 2 /LPF        ASSESSMENT/PLAN:                                                        ICD-10-CM    1. Uncontrolled hypertension I10 metoprolol succinate ER (TOPROL-XL) 200 MG 24 hr  tablet      triamterene-HCTZ (MAXZIDE) 75-50 MG tablet  Increased metoprolol ER to the above dose.  Low salt, low fat diet.   Exercise as tolerated.  Take meds as prescribed; call if with side effects.   Recheck BP after echo is done.     2. Left ventricular hypertrophy by electrocardiogram I51.7 Echocardiogram Complete  Patient was advised again of EKG findings.  Due to long-time uncontrolled HTN, echo has been recommended to look at heart structure. Patient concurred.  Patient is already on beta blocker.     3. Screening for malignant neoplasm of colon Z12.11 GASTROENTEROLOGY ADULT REF PROCEDURE ONLY [facility] [phone]; [place] [facility]     4. Need for prophylactic vaccination and inoculation against influenza Z23 FLU VACCINE, (RIV4) RECOMBINANT HA  , IM (FluBlok, egg free) [45232]- >18 YRS ([facility] recommended  50-64 YRS)     Vaccine Administration, Initial [26012]       Follow up with Provider - 2 weeks   Patient Instructions   Increase METOPROLOL Xl 100 MG to TOTAL  MG DAILY.    Keep Maxzide at the previously prescribed dose.    Be consistent with sodium restrictions.    Contact Cardiac Services  at [phone], to schedule echocardiogram appointment.    Follow up with Dr. Cintron after the echocardiogram is done.    Schedule colonoscopy for screening for colon cancer at your soonest convenience.    Thank you for choosing [facility].  You may be receiving a survey in the mail from [employer] regarding your visit today.  Please take a few minutes to complete and return the survey to let us know how we are doing.      If you have questions or concerns, please contact us via [employer] or you can contact your care team at [phone].    Our Clinic hours are:  Monday 6:40 am  to 7:00 pm  Tuesday -Friday 6:40 am to 5:00 pm    The [place] outpatient lab hours are:  Monday - Friday 6:10 am to 4:45 pm  Saturdays 7:00 am to 11:00 am  Appointments are required, call [phone]    If  you have clinical questions after hours or would like to schedule an appointment,  call the clinic at [phone].    Patient Education     Established High Blood Pressure    High blood pressure (hypertension) is a chronic disease. Often, healthcare providers don t know what causes it. But it can be caused by certain health conditions and medicines.  If you have high blood pressure, you may not have any symptoms. If you do have symptoms, they may include headache, dizziness, changes in your vision, chest pain, and shortness of breath. But even without symptoms, high blood pressure that s not treated raises your risk for heart attack, heart failure, and stroke. High blood pressure is a serious health risk and shouldn t be ignored.  Blood pressure measurements are given as 2 numbers. Systolic blood pressure is the upper number. This is the pressure when the heart contracts. Diastolic blood pressure is the lower number. This is the pressure when the heart relaxes between beats. You will see your blood pressure readings written together. For example, a person with a systolic pressure of 118 and a diastolic pressure of 78 will have 118/78 written in the medical record.  Blood pressure is categorized as normal, elevated, or stage 1 or stage 2 high blood pressure:    Normal blood pressure is systolic of less than 120 and diastolic of less than 80 (120/80)    Elevated blood pressure is systolic of 120 to 129 and diastolic less than 80    Stage 1 high blood pressure is systolic is 130 to 139 or diastolic between 80 to 89    Stage 2 high blood pressure is when systolic is 140 or higher or the diastolic is 90 or higher  Home care  If you have high blood pressure, follow these home care guidelines to help lower your blood pressure. If you are taking medicines for high blood pressure, these methods may reduce or end your need for medicines in the future.    Start a weight-loss program if you are overweight.    Cut back on how  much salt you get in your diet. Here s how to do this:  ? Don t eat foods that have a lot of salt. These include olives, pickles, smoked meats, and salted potato chips.  ? Don t add salt to your food at the table.  ? Use only small amounts of salt when cooking.    Start an exercise program. Talk with your healthcare provider about the type of exercise program that would be best for you. It doesn't have to be hard. Even brisk walking for 20 minutes 3 times a week is a good form of exercise.    Don t take medicines that stimulate the heart. This includes many over-the-counter cold and sinus decongestant pills and sprays, as well as diet pills. Check the warnings about high blood pressure on the label. Before buying any over-the-counter medicines or supplements, always ask the pharmacist about the product's potential interaction with your high blood pressure and your high blood pressure medicines.    Stimulants such as amphetamine or cocaine could be deadly for someone with high blood pressure. Never take these.    Limit how much caffeine you get in your diet. Switch to caffeine-free products.    Stop smoking. If you are a long-time smoker, this can be hard. Talk to your healthcare provider about medicines and nicotine replacement options to help you. Also, enroll in a stop-smoking program to make it more likely that you will quit for good.    Learn how to handle stress. This is an important part of any program to lower blood pressure. Learn about relaxation methods like meditation, yoga, or biofeedback.    If your provider prescribed medicines, take them exactly as directed. Missing doses may cause your blood pressure get out of control.    If you miss a dose or doses, check with your healthcare provider or pharmacist about what to do.    Consider buying an automatic blood pressure machine to check your blood pressure at home. Ask your provider for a recommendation. You can get one of these at most pharmacies.     The  American Heart Association recommends the following guidelines for home blood pressure monitoring:    Don't smoke or drink coffee for 30 minutes before taking your blood pressure.    Go to the bathroom before the test.    Relax for 5 minutes before taking the measurement.    Sit with your back supported (don't sit on a couch or soft chair); keep your feet on the floor uncrossed. Place your arm on a solid flat surface (like a table) with the upper part of the arm at heart level. Place the middle of the cuff directly above the bend of the elbow. Check the monitor's instruction manual for an illustration.    Take multiple readings. When you measure, take 2 to 3 readings one minute apart and record all of the results.    Take your blood pressure at the same time every day, or as your healthcare provider recommends.    Record the date, time, and blood pressure reading.    Take the record with you to your next medical appointment. If your blood pressure monitor has a built-in memory, simply take the monitor with you to your next appointment.    Call your provider if you have several high readings. Don't be frightened by a single high blood pressure reading, but if you get several high readings, check in with your healthcare provider.    Note: When blood pressure reaches a systolic (top number) of 180 or higher OR diastolic (bottom number) of 110 or higher, seek emergency medical treatment.  Follow-up care  You will need to see your healthcare provider regularly. This is to check your blood pressure and to make changes to your medicines. Make a follow-up appointment as directed. Bring the record of your home blood pressure readings to the appointment.  When to seek medical advice  Call your healthcare provider right away if any of these occur:    Blood pressure reaches a systolic (upper number) of 180 or higher OR a diastolic (bottom number) of 110 or higher    Chest pain or shortness of breath    Severe  headache    Throbbing or rushing sound in the ears    Nosebleed    Sudden severe pain in your belly (abdomen)    Extreme drowsiness, confusion, or fainting    Dizziness or spinning sensation (vertigo)    Weakness of an arm or leg or one side of the face    You have problems speaking or seeing   Date Last Reviewed: 12/1/2016 2000-2018 [employer]. [address] [place] [address]. All rights reserved. This information is not intended as a substitute for professional medical care. Always follow your healthcare professional's instructions.               Dejuan Cintron MD  [facility]      Injectable Influenza Immunization Documentation    1.  Is the person to be vaccinated sick today?   No    2. Does the person to be vaccinated have an allergy to a component   of the vaccine?   No  Egg Allergy Algorithm Link    3. Has the person to be vaccinated ever had a serious reaction   to influenza vaccine in the past?   No    4. Has the person to be vaccinated ever had Guillain-Barré syndrome?   No    Form completed by patient  DEL Silverman MA

## 2019-01-04 ENCOUNTER — HOSPITAL ENCOUNTER (OUTPATIENT)
Dept: CARDIOLOGY | Facility: CLINIC | Age: 54
Discharge: HOME | End: 2019-01-04
Attending: FAMILY MEDICINE | Admitting: FAMILY MEDICINE
Payer: COMMERCIAL

## 2019-01-04 DIAGNOSIS — I51.7 LEFT VENTRICULAR HYPERTROPHY BY ELECTROCARDIOGRAM: ICD-10-CM

## 2019-01-04 PROCEDURE — 25500064 ZZH RX 255 OP 636: Performed by: FAMILY MEDICINE

## 2019-01-04 PROCEDURE — 93306 TTE W/DOPPLER COMPLETE: CPT | Mod: 26 | Performed by: INTERNAL MEDICINE

## 2019-01-04 PROCEDURE — 40000264 ECHOCARDIOGRAM COMPLETE

## 2019-01-04 RX ADMIN — HUMAN ALBUMIN MICROSPHERES AND PERFLUTREN 1 ML: 10; .22 INJECTION, SOLUTION INTRAVENOUS at 13:02

## 2019-02-11 ENCOUNTER — OFFICE VISIT (OUTPATIENT)
Dept: FAMILY MEDICINE | Facility: CLINIC | Age: 54
End: 2019-02-11
Payer: COMMERCIAL

## 2019-02-11 VITALS
TEMPERATURE: 98.7 F | OXYGEN SATURATION: 97 % | HEIGHT: 72 IN | BODY MASS INDEX: 32.53 KG/M2 | SYSTOLIC BLOOD PRESSURE: 172 MMHG | WEIGHT: 240.2 LBS | HEART RATE: 112 BPM | RESPIRATION RATE: 16 BRPM | DIASTOLIC BLOOD PRESSURE: 108 MMHG

## 2019-02-11 DIAGNOSIS — I10 UNCONTROLLED HYPERTENSION: ICD-10-CM

## 2019-02-11 PROCEDURE — 99214 OFFICE O/P EST MOD 30 MIN: CPT | Performed by: FAMILY MEDICINE

## 2019-02-11 RX ORDER — METOPROLOL SUCCINATE 200 MG/1
200 TABLET, EXTENDED RELEASE ORAL DAILY
Qty: 90 TABLET | Refills: 3 | Status: ON HOLD | OUTPATIENT
Start: 2019-02-11 | End: 2022-09-22

## 2019-02-11 RX ORDER — TRIAMTERENE AND HYDROCHLOROTHIAZIDE 75; 50 MG/1; MG/1
1 TABLET ORAL DAILY
Qty: 90 TABLET | Refills: 3 | Status: CANCELLED | OUTPATIENT
Start: 2019-02-11

## 2019-02-11 RX ORDER — AMLODIPINE BESYLATE 2.5 MG/1
2.5 TABLET ORAL DAILY
Qty: 30 TABLET | Refills: 0 | Status: ON HOLD | OUTPATIENT
Start: 2019-02-11 | End: 2022-09-22

## 2019-02-11 ASSESSMENT — MIFFLIN-ST. JEOR: SCORE: 1959.6

## 2019-02-11 NOTE — PROGRESS NOTES
SUBJECTIVE:   Nicholas Martinez is a 54 year old male who presents to clinic today for the following health issues:  Chief Complaint   Patient presents with     Hypertension     Pt here for a f/u on b/p meds after increasing dose of metoprolol.     Health Maintenance     Reminded of colonoscopy.       Hypertension Follow-up      Outpatient blood pressures are being checked at home.  Results are 140-150's/90's; pulse usually 60's.    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    Denies chest pain, dyspnea, HA, BOV, dizziness or urinary changes.    Patient states he feels the medications make him feel tired, so he sleeps more, and this is the reason he feels he has gained weight.      Problem list and histories reviewed & adjusted, as indicated.  Additional history: as documented    Patient Active Problem List   Diagnosis     Essential hypertension with goal blood pressure less than 130/80     History reviewed. No pertinent surgical history.    Social History     Tobacco Use     Smoking status: Never Smoker     Smokeless tobacco: Never Used   Substance Use Topics     Alcohol use: Yes     Comment: occ     Family History   Problem Relation Age of Onset     Cancer Mother 58        liver CA     Hypertension Mother      Hypertension Father      Cerebrovascular Disease Paternal Grandmother      Asthma No family hx of      C.A.D. No family hx of      Diabetes No family hx of      Prostate Cancer No family hx of      Cancer - colorectal No family hx of          Current Outpatient Medications   Medication Sig Dispense Refill     amLODIPine (NORVASC) 2.5 MG tablet Take 1 tablet (2.5 mg) by mouth daily 30 tablet 0     metoprolol succinate ER (TOPROL-XL) 200 MG 24 hr tablet Take 1 tablet (200 mg) by mouth daily 90 tablet 3  "    triamterene-HCTZ (MAXZIDE) 75-50 MG tablet Take 1 tablet by mouth daily 90 tablet 3     No Known Allergies  BP Readings from Last 3 Encounters:   02/11/19 (!) 172/108   01/02/19 (!) 178/94   12/17/18 (!) 216/126    Wt Readings from Last 3 Encounters:   02/11/19 109 kg (240 lb 3.2 oz)   01/02/19 104.4 kg (230 lb 3.2 oz)   12/17/18 104.3 kg (230 lb)          Reviewed and updated as needed this visit by clinical staff  Tobacco  Allergies  Meds  Med Hx  Surg Hx  Fam Hx  Soc Hx      Reviewed and updated as needed this visit by Provider         ROS:  C: NEGATIVE for fever, chills or change in weight  I: NEGATIVE for worrisome rashes, moles or lesions  E: NEGATIVE for vision changes or irritation  R: NEGATIVE for significant cough or SOB  CV: NEGATIVE for chest pain, palpitations or peripheral edema  GI: NEGATIVE for nausea, abdominal pain, heartburn, or change in bowel habits  : NEGATIVE for frequency, dysuria, or hematuria  M: NEGATIVE for significant arthralgias or myalgia  N: NEGATIVE for weakness, dizziness or paresthesias  E: NEGATIVE for temperature intolerance, skin/hair changes  H: NEGATIVE for bleeding problems    OBJECTIVE:                                                    BP (!) 172/108   Pulse 112   Temp 98.7  F (37.1  C) (Tympanic)   Resp 16   Ht 1.816 m (5' 11.5\")   Wt 109 kg (240 lb 3.2 oz)   SpO2 97%   BMI 33.03 kg/m    Body mass index is 33.03 kg/m .  GENERAL: obese, alert and no distress, ambulatory w/o assist  NECK: no tenderness, no adenopathy,  Thyroid not enlarged  RESP: lungs clear to auscultation - no rales, no rhonchi, no wheezes  CV: regular rates and rhythm, no murmur  MS: no edema  SKIN: no suspicious lesions, no rashes  NEURO: strength and tone- normal, sensory exam- grossly normal, mentation- intact, speech- normal, reflexes- symmetric  ABD:  nontender    Diagnostic test results:  Diagnostic Test Results:  Results for orders placed or performed during the hospital " encounter of 19   Echocardiogram Complete    Narrative    [id number]  [id number]  [id number]  [id number]^ENMANUEL^PAUL^OLESYA LOCKWOOD           [facility]  Echocardiography Laboratory  [address]  [place] [address]        Name: JONAH PANIAGUA  MRN: [id number]  : 1965  Study Date: 2019 12:36 PM  Age: 53 yrs  Gender: Male  Patient Location: [facility]  Reason For Study: Left ventricular hypertrophy by electrocardiogram  Ordering Physician: PAUL SINGER  Referring Physician: Massimo Kumar  Performed By: Frida Mustafa RDCS     BSA: 2.3 m2  Height: 72 in  Weight: 230 lb  HR: 90  BP: 190/108 mmHg  _____________________________________________________________________________  __        Procedure  Complete Echo Adult. Contrast Optison.  _____________________________________________________________________________  __        Interpretation Summary     Note resting hypertension of 190/108 mmHg (patient reported not taking  medication today).     1. Normal left ventricular size and systolic function. LVEF 60-65%.  2. No regional wall motion abnormalities.  3. Normal right ventricular size and systolic function.  4. No hemodynamically significant valve disease.     There is no comparison study available.  _____________________________________________________________________________  __        Left Ventricle  The left ventricle is normal in size. There is normal left ventricular wall  thickness. Left ventricular systolic function is normal. The visual ejection  fraction is estimated at 60-65%. Grade I or early diastolic dysfunction. No  regional wall motion abnormalities noted.     Right Ventricle  The right ventricle is normal in size and function.     Atria  The left atrium is borderline dilated. Right atrial size is normal. There is  no color Doppler evidence of an atrial shunt.     Mitral Valve  The mitral valve leaflets appear normal. There is no evidence of stenosis,  fluttering, or prolapse.  There is trace mitral regurgitation.        Tricuspid Valve  Normal tricuspid valve. There is trace tricuspid regurgitation.     Aortic Valve  The aortic valve is trileaflet. There is trace aortic regurgitation. No  hemodynamically significant valvular aortic stenosis.     Pulmonic Valve  The pulmonic valve is not well visualized. There is trace pulmonic valvular  regurgitation.     Vessels  The aortic root is normal size. Inferior vena cava not well visualized for  estimation of right atrial pressure.     Pericardium  There is no pericardial effusion.        Rhythm  Sinus rhythm was noted.  _____________________________________________________________________________  __  MMode/2D Measurements & Calculations  IVSd: 1.2 cm     LVIDd: 4.6 cm  LVIDs: 2.8 cm  LVPWd: 0.98 cm  FS: 39.8 %  LV mass(C)d: 180.5 grams  LV mass(C)dI: 79.8 grams/m2  Ao root diam: 3.4 cm  LA dimension: 3.4 cm  LA/Ao: 1.0  LA Volume (BP): 61.0 ml  LA Volume Index (BP): 27.0 ml/m2  RWT: 0.43           Doppler Measurements & Calculations  MV E max chong: 61.1 cm/sec  MV A max chong: 93.5 cm/sec  MV E/A: 0.65  E/E' av.8  Lateral E/e': 5.2  Medial E/e': 10.4           _____________________________________________________________________________  __           Report approved by: Dr Eloy Palmer 2019 04:40 PM           ASSESSMENT/PLAN:                                                        ICD-10-CM    1. Uncontrolled hypertension I10 metoprolol succinate ER (TOPROL-XL) 200 MG 24 hr tablet     amLODIPine (NORVASC) 2.5 MG tablet     Patient was advised BP uncontrolled today.  Reviewed meds with patient.  Start amlodipine to optimize management.  Continue metoprolol XL and Maxzide.  Reinforced sodium restriction.  Exercise recommendations reviewed with patient.  Recheck with RN in 2 weeks.  Return precautions discussed and given to patient.       Follow up with RN 2 weeks.  Patient Instructions   Start amlodipine 2.5 mg daily.  Continue  metoprolol  mg daily and maxzide 75/50 mg daily.    Be more consistent with low salt diet.  Gradually increase exercise as you tolerate to help manage your weight.          Dejuan Cintron MD  [facility]

## 2019-02-11 NOTE — PATIENT INSTRUCTIONS
Start amlodipine 2.5 mg daily.  Continue metoprolol  mg daily and maxzide 75/50 mg daily.    Be more consistent with low salt diet.  Gradually increase exercise as you tolerate to help manage your weight.

## 2019-02-25 ENCOUNTER — TELEPHONE (OUTPATIENT)
Dept: FAMILY MEDICINE | Facility: CLINIC | Age: 54
End: 2019-02-25

## 2019-02-25 ENCOUNTER — ALLIED HEALTH/NURSE VISIT (OUTPATIENT)
Dept: FAMILY MEDICINE | Facility: CLINIC | Age: 54
End: 2019-02-25
Payer: COMMERCIAL

## 2019-02-25 VITALS — HEART RATE: 72 BPM | DIASTOLIC BLOOD PRESSURE: 98 MMHG | SYSTOLIC BLOOD PRESSURE: 160 MMHG

## 2019-02-25 DIAGNOSIS — I10 ESSENTIAL HYPERTENSION WITH GOAL BLOOD PRESSURE LESS THAN 130/80: Primary | ICD-10-CM

## 2019-02-25 PROCEDURE — 99207 ZZC NO CHARGE NURSE ONLY: CPT

## 2019-02-25 NOTE — TELEPHONE ENCOUNTER
Attempted to reach patient however, unable to leave message, mailbox is full.    Janette BOWERS RN

## 2019-02-25 NOTE — NURSING NOTE
Nicholas Martinez is a 54 year old year old patient who comes in today for a Blood Pressure check because of ongoing blood pressure monitoring.  Pt was last seen 2/11/19 and Dr Cintron added amlodipine, 2.5 mg, once daily.    Vital Signs as repeated by RN:  178/108, pulse of 84  160/98, pulse of 72, 5 min later    Pt brought home monitor for comparison.  It read 177/106, pulse of 74 immediately following today's first reading. Home monitor appears to be reading accurately.  Pt's last 3 home readings are 140/92, 139/95, and 137/97.  Pulses 67, 72, and 72 respectively.    Patient is taking medication as prescribed  Patient is tolerating medications well.  Patient is monitoring Blood Pressure at home.  Average readings are upper 130's/low 70's    Current complaints: the only complaint that pt has is that his low back has been sore.  He clarifies that it is with position changes only like getting out of a chair.  Pt says that he has been shoveling snow however so could be due to this.    Disposition:  Routed to Dr Cintron in a telephone encounter for further instructions.  Tasha Sharma RN      BP Readings from Last 6 Encounters:   02/25/19 (!) 160/98   02/11/19 (!) 172/108   01/02/19 (!) 178/94   12/17/18 (!) 216/126   12/17/18 (!) 240/126   11/10/14 (!) 175/106       Lab Results   Component Value Date    CR 0.97 12/17/2018     Lab Results   Component Value Date    POTASSIUM 3.6 12/17/2018

## 2019-02-25 NOTE — LETTER
February 27, 2019      Nicholas Martinez  [address] [address]        Dear Nicholas,     We care about your health. Dr. Cintron has reviewed your blood pressure check appointment with the nurse and asks that you come in for an appointment. Your blood pressure at this time is not well controlled. Please call [phone] to schedule.      Sincerely,        Dejuan Cintron MD/rona

## 2019-02-26 NOTE — TELEPHONE ENCOUNTER
2nd attempt.   I have attempted to contact this patient on cell phone with the following results: mailbox is full; unable to leave VM.  I have attempted to contact this patient on home phone with the following results: mailbox is full; unable to leave VM.      Monika WHEAT RN

## 2019-02-27 NOTE — TELEPHONE ENCOUNTER
Attempted to reach patient however mailbox is full.    Letter sent to patient.  Janette BOWERS RN

## 2019-05-20 ENCOUNTER — TELEPHONE (OUTPATIENT)
Dept: FAMILY MEDICINE | Facility: CLINIC | Age: 54
End: 2019-05-20

## 2019-05-20 NOTE — LETTER
May 20, 2019      Nicholas Martinez  [address] [address]          At this time you are due for a Colon Cancer Screening. Here is some information regarding this testing.     Recommended every 5-10 years, depending on your history, in order to prevent and detect colon cancer at its earliest stages.  Colon cancer is now the second leading cause of death in the [place] for both men and women and there are over 130,000 new cases and 50,000 deaths per year from colon cancer.  Colonoscopies can prevent 90-95% of these deaths.  Problem lesions can be removed before they ever become cancer. This test is not only looking for cancer, but also getting rid of precancerious lesions. You are usually given some sedation which makes the test very comfortable for most people.      If you do not wish to do a colonoscopy or cannot afford to do one, at this time, there is another option. It is called a FIT test or Fecal Immunochemical Occult Blood Test (take home stool sample kit).  It does not replace the colonoscopy for colorectal cancer screening, but it can detect hidden bleeding in the lower colon.  It does need to be repeated every year and if a positive result is obtained, you would be referred for a colonoscopy. The FIT test is really easy to do and does not require any  diet or medication restrictions and involves only one collection sample.      If you have completed either one of these tests or had a flexible sigmoidoscopy in the past five years at another facility, please have the records sent to our clinic so that we can best coordinate your care and update your chart.  Please call us if you have questions or would like arrange either to do a colonoscopy or obtain the necessary test kit for the Fecal Occult Test.      Sincerely,        Dejuan Cintron MD

## 2019-05-20 NOTE — TELEPHONE ENCOUNTER
Panel Management Review        Composite cancer screening  Chart review shows that this patient is due/due soon for the following Colonoscopy  Summary:    Patient is due/failing the following:   COLONOSCOPY    Action needed:   Patient needs referral/order: colonoscopy    Type of outreach:    Sent letter.    Questions for provider review:    None                                                                                                                                    Sheri Mooney CMA

## 2019-11-03 ENCOUNTER — HEALTH MAINTENANCE LETTER (OUTPATIENT)
Age: 54
End: 2019-11-03

## 2019-11-14 ENCOUNTER — TELEPHONE (OUTPATIENT)
Dept: FAMILY MEDICINE | Facility: CLINIC | Age: 54
End: 2019-11-14

## 2019-11-14 NOTE — LETTER
December 2, 2019      Nicholas MICHAELA Juan  [address] [address]          At this time you are due for a Colon Cancer Screening.  This was ordered for you on 1/2/19. Here is some information regarding this testing.     Recommended every 5-10 years, depending on your history, in order to prevent and detect colon cancer at its earliest stages.  Colon cancer is now the second leading cause of death in the [place] for both men and women and there are over 130,000 new cases and 50,000 deaths per year from colon cancer.  Colonoscopies can prevent 90-95% of these deaths.  Problem lesions can be removed before they ever become cancer. This test is not only looking for cancer, but also getting rid of precancerious lesions. You are usually given some sedation which makes the test very comfortable for most people.      If you do not wish to do a colonoscopy or cannot afford to do one, at this time, there is another option. It is called a FIT test or Fecal Immunochemical Occult Blood Test (take home stool sample kit).  It does not replace the colonoscopy for colorectal cancer screening, but it can detect hidden bleeding in the lower colon.  It does need to be repeated every year and if a positive result is obtained, you would be referred for a colonoscopy. The FIT test is really easy to do and does not require any  diet or medication restrictions and involves only one collection sample.      If you have completed either one of these tests or had a flexible sigmoidoscopy in the past five years at another facility, please have the records sent to our clinic so that we can best coordinate your care and update your chart.  Please call us if you have questions or would like arrange either to do a colonoscopy or obtain the necessary test kit for the Fecal Occult Test.      Sincerely,        Dejuan Cintron MD

## 2019-11-14 NOTE — TELEPHONE ENCOUNTER
Panel Management Review  Composite cancer screening  Chart review shows that this patient is due/due soon for the following Colonoscopy  Summary:    Patient is due/failing the following:   COLONOSCOPY    Action needed:   Patient needs referral/order: colonoscopy ordered 1/2/19.    Type of outreach:    Sent letter.    Questions for provider review:    None                                                                                                                                    Sheri Mooney CMA

## 2020-11-16 ENCOUNTER — HEALTH MAINTENANCE LETTER (OUTPATIENT)
Age: 55
End: 2020-11-16

## 2021-09-18 ENCOUNTER — HEALTH MAINTENANCE LETTER (OUTPATIENT)
Age: 56
End: 2021-09-18

## 2022-01-08 ENCOUNTER — HEALTH MAINTENANCE LETTER (OUTPATIENT)
Age: 57
End: 2022-01-08

## 2022-09-19 ENCOUNTER — HOSPITAL ENCOUNTER (INPATIENT)
Facility: CLINIC | Age: 57
LOS: 3 days | Discharge: HOME | DRG: 514 | End: 2022-09-22
Attending: PHYSICIAN ASSISTANT | Admitting: INTERNAL MEDICINE
Payer: OTHER MISCELLANEOUS

## 2022-09-19 ENCOUNTER — APPOINTMENT (OUTPATIENT)
Dept: GENERAL RADIOLOGY | Facility: CLINIC | Age: 57
DRG: 514 | End: 2022-09-19
Attending: PHYSICIAN ASSISTANT
Payer: OTHER MISCELLANEOUS

## 2022-09-19 DIAGNOSIS — Z23 NEED FOR PROPHYLACTIC VACCINATION AND INOCULATION AGAINST CHOLERA ALONE: ICD-10-CM

## 2022-09-19 DIAGNOSIS — I10 UNCONTROLLED HYPERTENSION: ICD-10-CM

## 2022-09-19 DIAGNOSIS — I10 ESSENTIAL HYPERTENSION WITH GOAL BLOOD PRESSURE LESS THAN 130/80: Primary | ICD-10-CM

## 2022-09-19 DIAGNOSIS — Z11.52 ENCOUNTER FOR SCREENING LABORATORY TESTING FOR SEVERE ACUTE RESPIRATORY SYNDROME CORONAVIRUS 2 (SARS-COV-2): ICD-10-CM

## 2022-09-19 DIAGNOSIS — M65.88 TENOSYNOVITIS, SUPPURATIVE: ICD-10-CM

## 2022-09-19 LAB
ANION GAP SERPL CALCULATED.3IONS-SCNC: 13 MMOL/L (ref 7–15)
BASOPHILS # BLD AUTO: 0 10E3/UL (ref 0–0.2)
BASOPHILS NFR BLD AUTO: 0 %
BUN SERPL-MCNC: 15.4 MG/DL (ref 6–20)
CALCIUM SERPL-MCNC: 8.9 MG/DL (ref 8.6–10)
CHLORIDE SERPL-SCNC: 99 MMOL/L (ref 98–107)
CREAT SERPL-MCNC: 0.93 MG/DL (ref 0.67–1.17)
DEPRECATED HCO3 PLAS-SCNC: 27 MMOL/L (ref 22–29)
EOSINOPHIL # BLD AUTO: 0.1 10E3/UL (ref 0–0.7)
EOSINOPHIL NFR BLD AUTO: 1 %
ERYTHROCYTE [DISTWIDTH] IN BLOOD BY AUTOMATED COUNT: 13 % (ref 10–15)
GFR SERPL CREATININE-BSD FRML MDRD: >90 ML/MIN/1.73M2
GLUCOSE SERPL-MCNC: 95 MG/DL (ref 70–99)
HCT VFR BLD AUTO: 48.7 % (ref 40–53)
HGB BLD-MCNC: 16.2 G/DL (ref 13.3–17.7)
IMM GRANULOCYTES # BLD: 0 10E3/UL
IMM GRANULOCYTES NFR BLD: 0 %
LYMPHOCYTES # BLD AUTO: 1.9 10E3/UL (ref 0.8–5.3)
LYMPHOCYTES NFR BLD AUTO: 23 %
MCH RBC QN AUTO: 28.3 PG (ref 26.5–33)
MCHC RBC AUTO-ENTMCNC: 33.3 G/DL (ref 31.5–36.5)
MCV RBC AUTO: 85 FL (ref 78–100)
MONOCYTES # BLD AUTO: 0.9 10E3/UL (ref 0–1.3)
MONOCYTES NFR BLD AUTO: 10 %
NEUTROPHILS # BLD AUTO: 5.7 10E3/UL (ref 1.6–8.3)
NEUTROPHILS NFR BLD AUTO: 66 %
NRBC # BLD AUTO: 0 10E3/UL
NRBC BLD AUTO-RTO: 0 /100
PLATELET # BLD AUTO: 245 10E3/UL (ref 150–450)
POTASSIUM SERPL-SCNC: 3.3 MMOL/L (ref 3.4–5.3)
RBC # BLD AUTO: 5.72 10E6/UL (ref 4.4–5.9)
SARS-COV-2 RNA RESP QL NAA+PROBE: NEGATIVE
SODIUM SERPL-SCNC: 139 MMOL/L (ref 136–145)
TROPONIN T SERPL HS-MCNC: 11 NG/L
TSH SERPL DL<=0.005 MIU/L-ACNC: 2.1 UIU/ML (ref 0.3–4.2)
TSH SERPL DL<=0.005 MIU/L-ACNC: 2.28 UIU/ML (ref 0.3–4.2)
WBC # BLD AUTO: 8.6 10E3/UL (ref 4–11)

## 2022-09-19 PROCEDURE — 99223 1ST HOSP IP/OBS HIGH 75: CPT | Mod: AI | Performed by: INTERNAL MEDICINE

## 2022-09-19 PROCEDURE — 73130 X-RAY EXAM OF HAND: CPT | Mod: LT

## 2022-09-19 PROCEDURE — 90471 IMMUNIZATION ADMIN: CPT | Performed by: FAMILY MEDICINE

## 2022-09-19 PROCEDURE — 84484 ASSAY OF TROPONIN QUANT: CPT | Performed by: FAMILY MEDICINE

## 2022-09-19 PROCEDURE — C9803 HOPD COVID-19 SPEC COLLECT: HCPCS | Performed by: FAMILY MEDICINE

## 2022-09-19 PROCEDURE — 36415 COLL VENOUS BLD VENIPUNCTURE: CPT | Performed by: FAMILY MEDICINE

## 2022-09-19 PROCEDURE — 82310 ASSAY OF CALCIUM: CPT | Performed by: FAMILY MEDICINE

## 2022-09-19 PROCEDURE — 96366 THER/PROPH/DIAG IV INF ADDON: CPT | Performed by: FAMILY MEDICINE

## 2022-09-19 PROCEDURE — 93010 ELECTROCARDIOGRAM REPORT: CPT | Performed by: FAMILY MEDICINE

## 2022-09-19 PROCEDURE — 250N000011 HC RX IP 250 OP 636: Performed by: FAMILY MEDICINE

## 2022-09-19 PROCEDURE — 99285 EMERGENCY DEPT VISIT HI MDM: CPT | Mod: FS | Performed by: FAMILY MEDICINE

## 2022-09-19 PROCEDURE — 87635 SARS-COV-2 COVID-19 AMP PRB: CPT | Performed by: FAMILY MEDICINE

## 2022-09-19 PROCEDURE — 96365 THER/PROPH/DIAG IV INF INIT: CPT | Performed by: FAMILY MEDICINE

## 2022-09-19 PROCEDURE — 85025 COMPLETE CBC W/AUTO DIFF WBC: CPT | Performed by: FAMILY MEDICINE

## 2022-09-19 PROCEDURE — 258N000003 HC RX IP 258 OP 636: Performed by: FAMILY MEDICINE

## 2022-09-19 PROCEDURE — 250N000013 HC RX MED GY IP 250 OP 250 PS 637: Performed by: FAMILY MEDICINE

## 2022-09-19 PROCEDURE — 93005 ELECTROCARDIOGRAM TRACING: CPT | Performed by: FAMILY MEDICINE

## 2022-09-19 PROCEDURE — 87641 MR-STAPH DNA AMP PROBE: CPT | Performed by: FAMILY MEDICINE

## 2022-09-19 PROCEDURE — 90715 TDAP VACCINE 7 YRS/> IM: CPT | Performed by: FAMILY MEDICINE

## 2022-09-19 PROCEDURE — 84443 ASSAY THYROID STIM HORMONE: CPT | Performed by: FAMILY MEDICINE

## 2022-09-19 PROCEDURE — 99285 EMERGENCY DEPT VISIT HI MDM: CPT | Mod: FS,25 | Performed by: FAMILY MEDICINE

## 2022-09-19 PROCEDURE — 200N000001 HC R&B ICU

## 2022-09-19 RX ORDER — CEFAZOLIN SODIUM 1 G/50ML
1750 SOLUTION INTRAVENOUS ONCE
Status: COMPLETED | OUTPATIENT
Start: 2022-09-19 | End: 2022-09-20

## 2022-09-19 RX ORDER — SULFAMETHOXAZOLE/TRIMETHOPRIM 800-160 MG
1 TABLET ORAL ONCE
Status: DISCONTINUED | OUTPATIENT
Start: 2022-09-19 | End: 2022-09-19

## 2022-09-19 RX ORDER — CEFAZOLIN SODIUM 1 G/50ML
1250 SOLUTION INTRAVENOUS EVERY 12 HOURS
Status: DISCONTINUED | OUTPATIENT
Start: 2022-09-20 | End: 2022-09-20

## 2022-09-19 RX ORDER — CEFAZOLIN SODIUM 2 G/100ML
2 INJECTION, SOLUTION INTRAVENOUS EVERY 6 HOURS
Status: DISCONTINUED | OUTPATIENT
Start: 2022-09-19 | End: 2022-09-19

## 2022-09-19 RX ORDER — LABETALOL 100 MG/1
100 TABLET, FILM COATED ORAL EVERY 12 HOURS SCHEDULED
Status: DISCONTINUED | OUTPATIENT
Start: 2022-09-19 | End: 2022-09-20

## 2022-09-19 RX ORDER — LISINOPRIL 10 MG/1
10 TABLET ORAL ONCE
Status: COMPLETED | OUTPATIENT
Start: 2022-09-19 | End: 2022-09-19

## 2022-09-19 RX ORDER — OXYCODONE HYDROCHLORIDE 5 MG/1
5 TABLET ORAL EVERY 4 HOURS PRN
Status: DISCONTINUED | OUTPATIENT
Start: 2022-09-19 | End: 2022-09-20

## 2022-09-19 RX ADMIN — CLOSTRIDIUM TETANI TOXOID ANTIGEN (FORMALDEHYDE INACTIVATED), CORYNEBACTERIUM DIPHTHERIAE TOXOID ANTIGEN (FORMALDEHYDE INACTIVATED), BORDETELLA PERTUSSIS TOXOID ANTIGEN (GLUTARALDEHYDE INACTIVATED), BORDETELLA PERTUSSIS FILAMENTOUS HEMAGGLUTININ ANTIGEN (FORMALDEHYDE INACTIVATED), BORDETELLA PERTUSSIS PERTACTIN ANTIGEN, AND BORDETELLA PERTUSSIS FIMBRIAE 2/3 ANTIGEN 0.5 ML: 5; 2; 2.5; 5; 3; 5 INJECTION, SUSPENSION INTRAMUSCULAR at 21:55

## 2022-09-19 RX ADMIN — OXYCODONE HYDROCHLORIDE 5 MG: 5 TABLET ORAL at 23:22

## 2022-09-19 RX ADMIN — LABETALOL HYDROCHLORIDE 100 MG: 100 TABLET, FILM COATED ORAL at 21:55

## 2022-09-19 RX ADMIN — LISINOPRIL 10 MG: 10 TABLET ORAL at 22:43

## 2022-09-19 RX ADMIN — VANCOMYCIN HYDROCHLORIDE 1750 MG: 10 INJECTION, POWDER, LYOPHILIZED, FOR SOLUTION INTRAVENOUS at 22:09

## 2022-09-19 ASSESSMENT — ENCOUNTER SYMPTOMS
ABDOMINAL PAIN: 0
CONSTIPATION: 0
NEUROLOGICAL NEGATIVE: 1
WOUND: 1
FEVER: 0
HEADACHES: 0
BLOOD IN STOOL: 0
COUGH: 0
SINUS PRESSURE: 0
SORE THROAT: 0
WHEEZING: 0
NAUSEA: 0
CHILLS: 1
DYSURIA: 0
FREQUENCY: 0
RESPIRATORY NEGATIVE: 1
COLOR CHANGE: 1
FEVER: 1
DIAPHORESIS: 0
VOMITING: 0
DIARRHEA: 0
SHORTNESS OF BREATH: 0
JOINT SWELLING: 1
PALPITATIONS: 0
CARDIOVASCULAR NEGATIVE: 1

## 2022-09-19 ASSESSMENT — ACTIVITIES OF DAILY LIVING (ADL)
FALL_HISTORY_WITHIN_LAST_SIX_MONTHS: NO
CONCENTRATING,_REMEMBERING_OR_MAKING_DECISIONS_DIFFICULTY: NO
ADLS_ACUITY_SCORE: 35
DRESSING/BATHING_DIFFICULTY: NO
DIFFICULTY_EATING/SWALLOWING: NO
TOILETING_ISSUES: NO
WEAR_GLASSES_OR_BLIND: NO
DOING_ERRANDS_INDEPENDENTLY_DIFFICULTY: NO
WALKING_OR_CLIMBING_STAIRS_DIFFICULTY: NO
ADLS_ACUITY_SCORE: 35
CHANGE_IN_FUNCTIONAL_STATUS_SINCE_ONSET_OF_CURRENT_ILLNESS/INJURY: NO

## 2022-09-19 NOTE — LETTER
9/22/2022     RE:  Nicholas Martinez                                                                                                                                                       [address] [address]            To whom it may concern:    Nicholas Martinez has been hospitalized under ourt professional care from 9/19/2022 to 9/22/2022. I have recommended that he not return to work until after seen by orthopedic surgeon in follow up in a week and cleared by them for work.       Sincerely,        Grupo Ornelas MD   [facility]

## 2022-09-20 ENCOUNTER — APPOINTMENT (OUTPATIENT)
Dept: MRI IMAGING | Facility: CLINIC | Age: 57
DRG: 514 | End: 2022-09-20
Attending: INTERNAL MEDICINE
Payer: OTHER MISCELLANEOUS

## 2022-09-20 ENCOUNTER — ANESTHESIA (OUTPATIENT)
Dept: SURGERY | Facility: CLINIC | Age: 57
DRG: 514 | End: 2022-09-20
Payer: OTHER MISCELLANEOUS

## 2022-09-20 ENCOUNTER — ANESTHESIA EVENT (OUTPATIENT)
Dept: SURGERY | Facility: CLINIC | Age: 57
DRG: 514 | End: 2022-09-20
Payer: OTHER MISCELLANEOUS

## 2022-09-20 PROBLEM — I16.0 ASYMPTOMATIC HYPERTENSIVE URGENCY: Status: ACTIVE | Noted: 2022-09-20

## 2022-09-20 LAB
ALBUMIN UR-MCNC: 30 MG/DL
AMPHETAMINES UR QL SCN: ABNORMAL
APPEARANCE UR: ABNORMAL
BACTERIA #/AREA URNS HPF: ABNORMAL /HPF
BARBITURATES UR QL SCN: ABNORMAL
BENZODIAZ UR QL SCN: ABNORMAL
BILIRUB UR QL STRIP: NEGATIVE
BZE UR QL SCN: ABNORMAL
CANNABINOIDS UR QL SCN: ABNORMAL
COLOR UR AUTO: YELLOW
GLUCOSE BLDC GLUCOMTR-MCNC: 101 MG/DL (ref 70–99)
GLUCOSE BLDC GLUCOMTR-MCNC: 115 MG/DL (ref 70–99)
GLUCOSE BLDC GLUCOMTR-MCNC: 86 MG/DL (ref 70–99)
GLUCOSE UR STRIP-MCNC: NEGATIVE MG/DL
HGB UR QL STRIP: NEGATIVE
KETONES UR STRIP-MCNC: 40 MG/DL
LEUKOCYTE ESTERASE UR QL STRIP: NEGATIVE
MRSA DNA SPEC QL NAA+PROBE: NEGATIVE
MUCOUS THREADS #/AREA URNS LPF: PRESENT /LPF
NITRATE UR QL: NEGATIVE
OPIATES UR QL SCN: ABNORMAL
PCP QUAL URINE (ROCHE): ABNORMAL
PH UR STRIP: 6 [PH] (ref 5–7)
RBC URINE: 4 /HPF
SA TARGET DNA: NEGATIVE
SP GR UR STRIP: 1.02 (ref 1–1.03)
SQUAMOUS EPITHELIAL: <1 /HPF
UROBILINOGEN UR STRIP-MCNC: 4 MG/DL
WBC URINE: 3 /HPF

## 2022-09-20 PROCEDURE — 80307 DRUG TEST PRSMV CHEM ANLYZR: CPT | Performed by: INTERNAL MEDICINE

## 2022-09-20 PROCEDURE — 250N000011 HC RX IP 250 OP 636: Performed by: NURSE ANESTHETIST, CERTIFIED REGISTERED

## 2022-09-20 PROCEDURE — 258N000003 HC RX IP 258 OP 636: Performed by: INTERNAL MEDICINE

## 2022-09-20 PROCEDURE — 99232 SBSQ HOSP IP/OBS MODERATE 35: CPT | Performed by: PHYSICIAN ASSISTANT

## 2022-09-20 PROCEDURE — 87075 CULTR BACTERIA EXCEPT BLOOD: CPT | Performed by: ORTHOPAEDIC SURGERY

## 2022-09-20 PROCEDURE — 250N000011 HC RX IP 250 OP 636: Performed by: INTERNAL MEDICINE

## 2022-09-20 PROCEDURE — 250N000013 HC RX MED GY IP 250 OP 250 PS 637: Performed by: FAMILY MEDICINE

## 2022-09-20 PROCEDURE — 258N000003 HC RX IP 258 OP 636: Performed by: ORTHOPAEDIC SURGERY

## 2022-09-20 PROCEDURE — 73220 MRI UPPR EXTREMITY W/O&W/DYE: CPT | Mod: LT

## 2022-09-20 PROCEDURE — 272N000001 HC OR GENERAL SUPPLY STERILE: Performed by: ORTHOPAEDIC SURGERY

## 2022-09-20 PROCEDURE — 258N000003 HC RX IP 258 OP 636: Performed by: NURSE ANESTHETIST, CERTIFIED REGISTERED

## 2022-09-20 PROCEDURE — 250N000011 HC RX IP 250 OP 636: Performed by: ORTHOPAEDIC SURGERY

## 2022-09-20 PROCEDURE — 87077 CULTURE AEROBIC IDENTIFY: CPT | Performed by: ORTHOPAEDIC SURGERY

## 2022-09-20 PROCEDURE — 120N000004 HC R&B MS OVERFLOW

## 2022-09-20 PROCEDURE — 250N000013 HC RX MED GY IP 250 OP 250 PS 637: Performed by: NURSE ANESTHETIST, CERTIFIED REGISTERED

## 2022-09-20 PROCEDURE — 255N000002 HC RX 255 OP 636: Performed by: INTERNAL MEDICINE

## 2022-09-20 PROCEDURE — 0L980ZZ DRAINAGE OF LEFT HAND TENDON, OPEN APPROACH: ICD-10-PCS | Performed by: ORTHOPAEDIC SURGERY

## 2022-09-20 PROCEDURE — 73220 MRI UPPR EXTREMITY W/O&W/DYE: CPT | Mod: 26 | Performed by: RADIOLOGY

## 2022-09-20 PROCEDURE — 250N000013 HC RX MED GY IP 250 OP 250 PS 637: Performed by: INTERNAL MEDICINE

## 2022-09-20 PROCEDURE — A9585 GADOBUTROL INJECTION: HCPCS | Performed by: INTERNAL MEDICINE

## 2022-09-20 PROCEDURE — 370N000017 HC ANESTHESIA TECHNICAL FEE, PER MIN: Performed by: ORTHOPAEDIC SURGERY

## 2022-09-20 PROCEDURE — 81001 URINALYSIS AUTO W/SCOPE: CPT | Performed by: INTERNAL MEDICINE

## 2022-09-20 PROCEDURE — 710N000012 HC RECOVERY PHASE 2, PER MINUTE: Performed by: ORTHOPAEDIC SURGERY

## 2022-09-20 PROCEDURE — 250N000009 HC RX 250: Performed by: ORTHOPAEDIC SURGERY

## 2022-09-20 PROCEDURE — 87070 CULTURE OTHR SPECIMN AEROBIC: CPT | Performed by: ORTHOPAEDIC SURGERY

## 2022-09-20 PROCEDURE — 360N000076 HC SURGERY LEVEL 3, PER MIN: Performed by: ORTHOPAEDIC SURGERY

## 2022-09-20 PROCEDURE — 999N000141 HC STATISTIC PRE-PROCEDURE NURSING ASSESSMENT: Performed by: ORTHOPAEDIC SURGERY

## 2022-09-20 PROCEDURE — 250N000009 HC RX 250: Performed by: NURSE ANESTHETIST, CERTIFIED REGISTERED

## 2022-09-20 RX ORDER — AMLODIPINE BESYLATE 5 MG/1
5 TABLET ORAL DAILY
Status: DISCONTINUED | OUTPATIENT
Start: 2022-09-20 | End: 2022-09-21

## 2022-09-20 RX ORDER — DEXTROSE MONOHYDRATE 25 G/50ML
25-50 INJECTION, SOLUTION INTRAVENOUS
Status: DISCONTINUED | OUTPATIENT
Start: 2022-09-20 | End: 2022-09-22 | Stop reason: HOSPADM

## 2022-09-20 RX ORDER — AMOXICILLIN 250 MG
1 CAPSULE ORAL 2 TIMES DAILY PRN
Status: DISCONTINUED | OUTPATIENT
Start: 2022-09-20 | End: 2022-09-22 | Stop reason: HOSPADM

## 2022-09-20 RX ORDER — KETOROLAC TROMETHAMINE 15 MG/ML
15 INJECTION, SOLUTION INTRAMUSCULAR; INTRAVENOUS EVERY 6 HOURS
Status: COMPLETED | OUTPATIENT
Start: 2022-09-20 | End: 2022-09-21

## 2022-09-20 RX ORDER — AMOXICILLIN 250 MG
2 CAPSULE ORAL 2 TIMES DAILY PRN
Status: DISCONTINUED | OUTPATIENT
Start: 2022-09-20 | End: 2022-09-22 | Stop reason: HOSPADM

## 2022-09-20 RX ORDER — LIDOCAINE 40 MG/G
CREAM TOPICAL
Status: DISCONTINUED | OUTPATIENT
Start: 2022-09-20 | End: 2022-09-22 | Stop reason: HOSPADM

## 2022-09-20 RX ORDER — ONDANSETRON 2 MG/ML
INJECTION INTRAMUSCULAR; INTRAVENOUS PRN
Status: DISCONTINUED | OUTPATIENT
Start: 2022-09-20 | End: 2022-09-20

## 2022-09-20 RX ORDER — ACETAMINOPHEN 325 MG/1
975 TABLET ORAL ONCE
Status: COMPLETED | OUTPATIENT
Start: 2022-09-20 | End: 2022-09-20

## 2022-09-20 RX ORDER — NALOXONE HYDROCHLORIDE 0.4 MG/ML
0.2 INJECTION, SOLUTION INTRAMUSCULAR; INTRAVENOUS; SUBCUTANEOUS
Status: DISCONTINUED | OUTPATIENT
Start: 2022-09-20 | End: 2022-09-22 | Stop reason: HOSPADM

## 2022-09-20 RX ORDER — HYDROMORPHONE HYDROCHLORIDE 1 MG/ML
.3-.5 INJECTION, SOLUTION INTRAMUSCULAR; INTRAVENOUS; SUBCUTANEOUS
Status: DISCONTINUED | OUTPATIENT
Start: 2022-09-20 | End: 2022-09-20

## 2022-09-20 RX ORDER — ROPIVACAINE HYDROCHLORIDE 5 MG/ML
INJECTION, SOLUTION EPIDURAL; INFILTRATION; PERINEURAL PRN
Status: DISCONTINUED | OUTPATIENT
Start: 2022-09-20 | End: 2022-09-20

## 2022-09-20 RX ORDER — BISACODYL 10 MG
10 SUPPOSITORY, RECTAL RECTAL DAILY PRN
Status: DISCONTINUED | OUTPATIENT
Start: 2022-09-20 | End: 2022-09-22 | Stop reason: HOSPADM

## 2022-09-20 RX ORDER — BUPIVACAINE HYDROCHLORIDE AND EPINEPHRINE 5; 5 MG/ML; UG/ML
INJECTION, SOLUTION PERINEURAL PRN
Status: DISCONTINUED | OUTPATIENT
Start: 2022-09-20 | End: 2022-09-20 | Stop reason: HOSPADM

## 2022-09-20 RX ORDER — GADOBUTROL 604.72 MG/ML
10 INJECTION INTRAVENOUS ONCE
Status: COMPLETED | OUTPATIENT
Start: 2022-09-20 | End: 2022-09-20

## 2022-09-20 RX ORDER — PROCHLORPERAZINE 25 MG
25 SUPPOSITORY, RECTAL RECTAL EVERY 12 HOURS PRN
Status: DISCONTINUED | OUTPATIENT
Start: 2022-09-20 | End: 2022-09-20

## 2022-09-20 RX ORDER — OXYCODONE HYDROCHLORIDE 5 MG/1
5-10 TABLET ORAL
Status: DISCONTINUED | OUTPATIENT
Start: 2022-09-20 | End: 2022-09-20

## 2022-09-20 RX ORDER — ACETAMINOPHEN 325 MG/1
650 TABLET ORAL EVERY 4 HOURS PRN
Status: DISCONTINUED | OUTPATIENT
Start: 2022-09-20 | End: 2022-09-22 | Stop reason: HOSPADM

## 2022-09-20 RX ORDER — ONDANSETRON 2 MG/ML
4 INJECTION INTRAMUSCULAR; INTRAVENOUS EVERY 6 HOURS PRN
Status: DISCONTINUED | OUTPATIENT
Start: 2022-09-20 | End: 2022-09-20

## 2022-09-20 RX ORDER — CALCIUM CITRATE/VITAMIN D3 200MG-6.25
1 TABLET ORAL DAILY
COMMUNITY

## 2022-09-20 RX ORDER — LIDOCAINE HYDROCHLORIDE 10 MG/ML
INJECTION, SOLUTION EPIDURAL; INFILTRATION; INTRACAUDAL; PERINEURAL PRN
Status: DISCONTINUED | OUTPATIENT
Start: 2022-09-20 | End: 2022-09-20

## 2022-09-20 RX ORDER — NALOXONE HYDROCHLORIDE 0.4 MG/ML
0.4 INJECTION, SOLUTION INTRAMUSCULAR; INTRAVENOUS; SUBCUTANEOUS
Status: DISCONTINUED | OUTPATIENT
Start: 2022-09-20 | End: 2022-09-22 | Stop reason: HOSPADM

## 2022-09-20 RX ORDER — ONDANSETRON 4 MG/1
4 TABLET, ORALLY DISINTEGRATING ORAL EVERY 6 HOURS PRN
Status: DISCONTINUED | OUTPATIENT
Start: 2022-09-20 | End: 2022-09-20

## 2022-09-20 RX ORDER — AMOXICILLIN 250 MG
1 CAPSULE ORAL 2 TIMES DAILY PRN
Status: DISCONTINUED | OUTPATIENT
Start: 2022-09-20 | End: 2022-09-20

## 2022-09-20 RX ORDER — PROPOFOL 10 MG/ML
INJECTION, EMULSION INTRAVENOUS CONTINUOUS PRN
Status: DISCONTINUED | OUTPATIENT
Start: 2022-09-20 | End: 2022-09-20

## 2022-09-20 RX ORDER — POLYETHYLENE GLYCOL 3350 17 G/17G
17 POWDER, FOR SOLUTION ORAL DAILY
Status: DISCONTINUED | OUTPATIENT
Start: 2022-09-21 | End: 2022-09-22 | Stop reason: HOSPADM

## 2022-09-20 RX ORDER — LIDOCAINE HYDROCHLORIDE AND EPINEPHRINE BITARTRATE 20; .01 MG/ML; MG/ML
INJECTION, SOLUTION SUBCUTANEOUS PRN
Status: DISCONTINUED | OUTPATIENT
Start: 2022-09-20 | End: 2022-09-20

## 2022-09-20 RX ORDER — LABETALOL HYDROCHLORIDE 5 MG/ML
10 INJECTION, SOLUTION INTRAVENOUS EVERY 10 MIN PRN
Status: DISCONTINUED | OUTPATIENT
Start: 2022-09-20 | End: 2022-09-22 | Stop reason: HOSPADM

## 2022-09-20 RX ORDER — NICOTINE POLACRILEX 4 MG
15-30 LOZENGE BUCCAL
Status: DISCONTINUED | OUTPATIENT
Start: 2022-09-20 | End: 2022-09-22 | Stop reason: HOSPADM

## 2022-09-20 RX ORDER — HYDROMORPHONE HCL IN WATER/PF 6 MG/30 ML
0.4 PATIENT CONTROLLED ANALGESIA SYRINGE INTRAVENOUS
Status: DISCONTINUED | OUTPATIENT
Start: 2022-09-20 | End: 2022-09-22 | Stop reason: HOSPADM

## 2022-09-20 RX ORDER — SODIUM CHLORIDE, SODIUM LACTATE, POTASSIUM CHLORIDE, CALCIUM CHLORIDE 600; 310; 30; 20 MG/100ML; MG/100ML; MG/100ML; MG/100ML
INJECTION, SOLUTION INTRAVENOUS CONTINUOUS
Status: DISCONTINUED | OUTPATIENT
Start: 2022-09-20 | End: 2022-09-20

## 2022-09-20 RX ORDER — AMOXICILLIN 250 MG
2 CAPSULE ORAL 2 TIMES DAILY PRN
Status: DISCONTINUED | OUTPATIENT
Start: 2022-09-20 | End: 2022-09-20

## 2022-09-20 RX ORDER — SODIUM CHLORIDE 9 MG/ML
INJECTION, SOLUTION INTRAVENOUS CONTINUOUS
Status: DISCONTINUED | OUTPATIENT
Start: 2022-09-20 | End: 2022-09-20

## 2022-09-20 RX ORDER — PROCHLORPERAZINE MALEATE 10 MG
10 TABLET ORAL EVERY 6 HOURS PRN
Status: DISCONTINUED | OUTPATIENT
Start: 2022-09-20 | End: 2022-09-20

## 2022-09-20 RX ORDER — FENTANYL CITRATE 50 UG/ML
INJECTION, SOLUTION INTRAMUSCULAR; INTRAVENOUS PRN
Status: DISCONTINUED | OUTPATIENT
Start: 2022-09-20 | End: 2022-09-20

## 2022-09-20 RX ORDER — HYDROMORPHONE HCL IN WATER/PF 6 MG/30 ML
0.2 PATIENT CONTROLLED ANALGESIA SYRINGE INTRAVENOUS
Status: DISCONTINUED | OUTPATIENT
Start: 2022-09-20 | End: 2022-09-22 | Stop reason: HOSPADM

## 2022-09-20 RX ORDER — MAGNESIUM CARB/ALUMINUM HYDROX 105-160MG
148 TABLET,CHEWABLE ORAL
Status: DISCONTINUED | OUTPATIENT
Start: 2022-09-20 | End: 2022-09-20 | Stop reason: RX

## 2022-09-20 RX ORDER — PROCHLORPERAZINE MALEATE 5 MG
10 TABLET ORAL EVERY 6 HOURS PRN
Status: DISCONTINUED | OUTPATIENT
Start: 2022-09-20 | End: 2022-09-22 | Stop reason: HOSPADM

## 2022-09-20 RX ORDER — KETOROLAC TROMETHAMINE 30 MG/ML
INJECTION, SOLUTION INTRAMUSCULAR; INTRAVENOUS PRN
Status: DISCONTINUED | OUTPATIENT
Start: 2022-09-20 | End: 2022-09-20

## 2022-09-20 RX ORDER — KETAMINE HYDROCHLORIDE 10 MG/ML
INJECTION INTRAMUSCULAR; INTRAVENOUS PRN
Status: DISCONTINUED | OUTPATIENT
Start: 2022-09-20 | End: 2022-09-20

## 2022-09-20 RX ORDER — ONDANSETRON 4 MG/1
4 TABLET, ORALLY DISINTEGRATING ORAL EVERY 6 HOURS PRN
Status: DISCONTINUED | OUTPATIENT
Start: 2022-09-20 | End: 2022-09-22 | Stop reason: HOSPADM

## 2022-09-20 RX ORDER — HYDRALAZINE HYDROCHLORIDE 20 MG/ML
10-20 INJECTION INTRAMUSCULAR; INTRAVENOUS
Status: DISCONTINUED | OUTPATIENT
Start: 2022-09-20 | End: 2022-09-22 | Stop reason: HOSPADM

## 2022-09-20 RX ORDER — ONDANSETRON 2 MG/ML
4 INJECTION INTRAMUSCULAR; INTRAVENOUS EVERY 6 HOURS PRN
Status: DISCONTINUED | OUTPATIENT
Start: 2022-09-20 | End: 2022-09-22 | Stop reason: HOSPADM

## 2022-09-20 RX ORDER — OXYCODONE HYDROCHLORIDE 5 MG/1
5 TABLET ORAL EVERY 4 HOURS PRN
Status: DISCONTINUED | OUTPATIENT
Start: 2022-09-20 | End: 2022-09-22 | Stop reason: HOSPADM

## 2022-09-20 RX ORDER — DEXAMETHASONE SODIUM PHOSPHATE 4 MG/ML
INJECTION, SOLUTION INTRA-ARTICULAR; INTRALESIONAL; INTRAMUSCULAR; INTRAVENOUS; SOFT TISSUE PRN
Status: DISCONTINUED | OUTPATIENT
Start: 2022-09-20 | End: 2022-09-20

## 2022-09-20 RX ORDER — GABAPENTIN 300 MG/1
300 CAPSULE ORAL
Status: COMPLETED | OUTPATIENT
Start: 2022-09-20 | End: 2022-09-20

## 2022-09-20 RX ORDER — ACETAMINOPHEN 325 MG/1
650 TABLET ORAL EVERY 4 HOURS PRN
Status: DISCONTINUED | OUTPATIENT
Start: 2022-09-20 | End: 2022-09-20

## 2022-09-20 RX ORDER — LIDOCAINE HYDROCHLORIDE 10 MG/ML
INJECTION, SOLUTION INFILTRATION; PERINEURAL PRN
Status: DISCONTINUED | OUTPATIENT
Start: 2022-09-20 | End: 2022-09-20

## 2022-09-20 RX ORDER — POLYETHYLENE GLYCOL 3350 17 G/17G
17 POWDER, FOR SOLUTION ORAL DAILY PRN
Status: DISCONTINUED | OUTPATIENT
Start: 2022-09-20 | End: 2022-09-22 | Stop reason: HOSPADM

## 2022-09-20 RX ORDER — SODIUM CHLORIDE, SODIUM LACTATE, POTASSIUM CHLORIDE, CALCIUM CHLORIDE 600; 310; 30; 20 MG/100ML; MG/100ML; MG/100ML; MG/100ML
INJECTION, SOLUTION INTRAVENOUS CONTINUOUS PRN
Status: DISCONTINUED | OUTPATIENT
Start: 2022-09-20 | End: 2022-09-20

## 2022-09-20 RX ORDER — AMPICILLIN AND SULBACTAM 2; 1 G/1; G/1
3 INJECTION, POWDER, FOR SOLUTION INTRAMUSCULAR; INTRAVENOUS EVERY 6 HOURS
Status: DISCONTINUED | OUTPATIENT
Start: 2022-09-20 | End: 2022-09-22 | Stop reason: HOSPADM

## 2022-09-20 RX ORDER — AMOXICILLIN 250 MG
1 CAPSULE ORAL 2 TIMES DAILY
Status: DISCONTINUED | OUTPATIENT
Start: 2022-09-20 | End: 2022-09-22 | Stop reason: HOSPADM

## 2022-09-20 RX ORDER — OXYCODONE HYDROCHLORIDE 5 MG/1
10 TABLET ORAL EVERY 4 HOURS PRN
Status: DISCONTINUED | OUTPATIENT
Start: 2022-09-20 | End: 2022-09-22 | Stop reason: HOSPADM

## 2022-09-20 RX ORDER — LISINOPRIL 10 MG/1
10 TABLET ORAL DAILY
Status: DISCONTINUED | OUTPATIENT
Start: 2022-09-20 | End: 2022-09-21

## 2022-09-20 RX ORDER — SODIUM CHLORIDE, SODIUM LACTATE, POTASSIUM CHLORIDE, CALCIUM CHLORIDE 600; 310; 30; 20 MG/100ML; MG/100ML; MG/100ML; MG/100ML
INJECTION, SOLUTION INTRAVENOUS CONTINUOUS
Status: DISCONTINUED | OUTPATIENT
Start: 2022-09-20 | End: 2022-09-21

## 2022-09-20 RX ORDER — LIDOCAINE 40 MG/G
CREAM TOPICAL
Status: DISCONTINUED | OUTPATIENT
Start: 2022-09-20 | End: 2022-09-20

## 2022-09-20 RX ADMIN — MIDAZOLAM 2 MG: 1 INJECTION INTRAMUSCULAR; INTRAVENOUS at 16:44

## 2022-09-20 RX ADMIN — PHENYLEPHRINE HYDROCHLORIDE 200 MCG: 10 INJECTION INTRAVENOUS at 17:55

## 2022-09-20 RX ADMIN — FENTANYL CITRATE 100 MCG: 50 INJECTION, SOLUTION INTRAMUSCULAR; INTRAVENOUS at 16:44

## 2022-09-20 RX ADMIN — SODIUM CHLORIDE, POTASSIUM CHLORIDE, SODIUM LACTATE AND CALCIUM CHLORIDE: 600; 310; 30; 20 INJECTION, SOLUTION INTRAVENOUS at 19:09

## 2022-09-20 RX ADMIN — ACETAMINOPHEN 975 MG: 325 TABLET, FILM COATED ORAL at 16:47

## 2022-09-20 RX ADMIN — KETOROLAC TROMETHAMINE 15 MG: 30 INJECTION, SOLUTION INTRAMUSCULAR at 17:44

## 2022-09-20 RX ADMIN — ROPIVACAINE HYDROCHLORIDE 30 ML: 5 INJECTION, SOLUTION EPIDURAL; INFILTRATION; PERINEURAL at 17:05

## 2022-09-20 RX ADMIN — LABETALOL HYDROCHLORIDE 10 MG: 5 INJECTION, SOLUTION INTRAVENOUS at 00:30

## 2022-09-20 RX ADMIN — VANCOMYCIN HYDROCHLORIDE 1250 MG: 10 INJECTION, POWDER, LYOPHILIZED, FOR SOLUTION INTRAVENOUS at 12:11

## 2022-09-20 RX ADMIN — AMPICILLIN SODIUM AND SULBACTAM SODIUM 3 G: 2; 1 INJECTION, POWDER, FOR SOLUTION INTRAMUSCULAR; INTRAVENOUS at 06:26

## 2022-09-20 RX ADMIN — HYDROMORPHONE HYDROCHLORIDE 0.5 MG: 1 INJECTION, SOLUTION INTRAMUSCULAR; INTRAVENOUS; SUBCUTANEOUS at 11:29

## 2022-09-20 RX ADMIN — LISINOPRIL 10 MG: 10 TABLET ORAL at 08:01

## 2022-09-20 RX ADMIN — LABETALOL HYDROCHLORIDE 10 MG: 5 INJECTION, SOLUTION INTRAVENOUS at 02:09

## 2022-09-20 RX ADMIN — DEXAMETHASONE SODIUM PHOSPHATE 4 MG: 4 INJECTION, SOLUTION INTRA-ARTICULAR; INTRALESIONAL; INTRAMUSCULAR; INTRAVENOUS; SOFT TISSUE at 17:33

## 2022-09-20 RX ADMIN — AMPICILLIN SODIUM AND SULBACTAM SODIUM 3 G: 2; 1 INJECTION, POWDER, FOR SOLUTION INTRAMUSCULAR; INTRAVENOUS at 00:33

## 2022-09-20 RX ADMIN — SODIUM CHLORIDE, POTASSIUM CHLORIDE, SODIUM LACTATE AND CALCIUM CHLORIDE: 600; 310; 30; 20 INJECTION, SOLUTION INTRAVENOUS at 16:31

## 2022-09-20 RX ADMIN — AMPICILLIN SODIUM AND SULBACTAM SODIUM 3 G: 2; 1 INJECTION, POWDER, FOR SOLUTION INTRAMUSCULAR; INTRAVENOUS at 17:30

## 2022-09-20 RX ADMIN — GABAPENTIN 300 MG: 300 CAPSULE ORAL at 16:47

## 2022-09-20 RX ADMIN — OXYCODONE HYDROCHLORIDE 10 MG: 5 TABLET ORAL at 01:42

## 2022-09-20 RX ADMIN — LABETALOL HYDROCHLORIDE 10 MG: 5 INJECTION, SOLUTION INTRAVENOUS at 09:11

## 2022-09-20 RX ADMIN — SODIUM CHLORIDE: 9 INJECTION, SOLUTION INTRAVENOUS at 10:10

## 2022-09-20 RX ADMIN — AMLODIPINE BESYLATE 5 MG: 5 TABLET ORAL at 08:01

## 2022-09-20 RX ADMIN — LABETALOL HYDROCHLORIDE 10 MG: 5 INJECTION, SOLUTION INTRAVENOUS at 01:38

## 2022-09-20 RX ADMIN — Medication 5 ML: at 17:02

## 2022-09-20 RX ADMIN — KETOROLAC TROMETHAMINE 15 MG: 15 INJECTION, SOLUTION INTRAMUSCULAR; INTRAVENOUS at 23:34

## 2022-09-20 RX ADMIN — PROPOFOL 200 MCG/KG/MIN: 10 INJECTION, EMULSION INTRAVENOUS at 17:33

## 2022-09-20 RX ADMIN — LIDOCAINE HYDROCHLORIDE 50 MG: 10 INJECTION, SOLUTION INFILTRATION; PERINEURAL at 17:33

## 2022-09-20 RX ADMIN — ONDANSETRON 4 MG: 2 INJECTION INTRAMUSCULAR; INTRAVENOUS at 17:30

## 2022-09-20 RX ADMIN — LABETALOL HYDROCHLORIDE 10 MG: 5 INJECTION, SOLUTION INTRAVENOUS at 12:07

## 2022-09-20 RX ADMIN — SODIUM CHLORIDE: 9 INJECTION, SOLUTION INTRAVENOUS at 00:28

## 2022-09-20 RX ADMIN — HYDROMORPHONE HYDROCHLORIDE 0.5 MG: 1 INJECTION, SOLUTION INTRAMUSCULAR; INTRAVENOUS; SUBCUTANEOUS at 00:28

## 2022-09-20 RX ADMIN — AMPICILLIN SODIUM AND SULBACTAM SODIUM 3 G: 2; 1 INJECTION, POWDER, FOR SOLUTION INTRAMUSCULAR; INTRAVENOUS at 23:34

## 2022-09-20 RX ADMIN — KETAMINE HYDROCHLORIDE 20 MG: 10 INJECTION, SOLUTION INTRAMUSCULAR; INTRAVENOUS at 17:33

## 2022-09-20 RX ADMIN — ACETAMINOPHEN 650 MG: 325 TABLET, FILM COATED ORAL at 01:42

## 2022-09-20 RX ADMIN — PHENYLEPHRINE HYDROCHLORIDE 200 MCG: 10 INJECTION INTRAVENOUS at 18:14

## 2022-09-20 RX ADMIN — AMPICILLIN SODIUM AND SULBACTAM SODIUM 3 G: 2; 1 INJECTION, POWDER, FOR SOLUTION INTRAMUSCULAR; INTRAVENOUS at 13:37

## 2022-09-20 RX ADMIN — GADOBUTROL 10 ML: 604.72 INJECTION INTRAVENOUS at 11:09

## 2022-09-20 RX ADMIN — LABETALOL HYDROCHLORIDE 10 MG: 5 INJECTION, SOLUTION INTRAVENOUS at 15:11

## 2022-09-20 RX ADMIN — LABETALOL HYDROCHLORIDE 10 MG: 5 INJECTION, SOLUTION INTRAVENOUS at 16:26

## 2022-09-20 RX ADMIN — LIDOCAINE HYDROCHLORIDE 3 ML: 10 INJECTION, SOLUTION EPIDURAL; INFILTRATION; INTRACAUDAL; PERINEURAL at 17:00

## 2022-09-20 ASSESSMENT — ACTIVITIES OF DAILY LIVING (ADL)
ADLS_ACUITY_SCORE: 18

## 2022-09-20 NOTE — PROGRESS NOTES
"[facility] [place]    Patient admitted to room IU9  at approximately 0000 via cart from emergency room. Patient was accompanied by nurse.     Verbal SBAR report received from ED RN prior to patient arrival.     Patient ambulated to bed with stand-by assist. Patient alert and oriented X 3. Pain is controlled with current analgesics.  Medication(s) being used: narcotic analgesics including oxycodone (Oxycontin, Oxyir).  . Admission vital signs: Blood pressure (!) 228/139, pulse 98, temperature 98.9  F (37.2  C), temperature source Oral, resp. rate 14, height 1.778 m (5' 10\"), weight 105.1 kg (231 lb 11.3 oz), SpO2 99 %. Patient was oriented to plan of care, call light, bed controls, tv, telephone, bathroom and visiting hours.     Risk Assessment    The following safety risks were identified during admission: fall. Yellow risk band applied: YES.     Skin Initial Assessment    This writer admitted this patient and completed a full skin assessment and Melvin score in the Adult PCS flowsheet. Appropriate interventions initiated as needed.     Secondary skin check completed by JOSELINE SOOD RN  .         Education    Patient has a [place] to Observation order: No  Observation education completed and documented: N/A      Crys Cleveland RN    "

## 2022-09-20 NOTE — PROGRESS NOTES
[facility]    Medicine Progress Note - Hospitalist Service    Date of Admission:  2022    Assessment & Plan          Nicholas Martinez is a 57 year old male admitted on 2022. He is admitted with tenosynovitis of his left hand and hypertensive urgency.     Left hand tenosynovitis  Occurs in the setting of recent construction project handling fiberglass, but no obvious open injury. Left hand x-ray series shows no fractures or foreign bodies but moderate circumferential soft tissue swelling involving the proximal portions of the second through fourth digits. Afebrile, no leukocytosis at time of admission. Case was discussed between the emergency department and on-call ortho, who advised admission and starting vancomycin along with MRI imaging.   Nasal MRSA PCR negative.   - Orthopedic surgery consulted and following, currently awaiting MRI results to determine if I&D is indicated  - MRI of the left hand pending  - Vancomycin initiated late evening , Unasyn added early morning , continue; could consider stopping vancomycin due to negative MRSA, although will defer to ortho team  - Pain control with acetaminophen, oxycodone, and IV hydromorphone  - NPO in case I&D is needed  - See pictures of hand in notes from  (both Adria Oneill PA-C and Dr. Vidal)     Hypertensive urgency  Patient with prior diagnosis of hypertension, had been prescribed antihypertensives in the past (amlodipine 2.5 mg daily, metoprolol  mg daily, and triamterene 75-50 mg daily) but discontinued taking them a few years ago once prescription . Blood pressure on arrival to the emergency department was 256/142 mmHg.  The patient denies headaches, chest pain, or shortness of breath. ECG demonstrates no evidence of myocardial ischemia. No evidence of end organ damage. The patient was given labetalol 100 mg and lisinopril 10 mg in the emergency department.  Utox was positive for amphetamines; it  "does not appear he is prescribed any pharmacologic amphetamine treatments.   - Initially admitted under ICU status for close neurologic hemodynamic monitoring, changed to med/surg 9/20/2022 as blood pressure responding to interventions  - Neuro checks every 4 hours  - IV labetalol and IV hydralazine to maintain blood pressures less than 200/110 mmHg  - Amlodipine 5 mg and lisinopril 10 mg initiated morning 9/20/2022   - Consider sleep study  - Follow up with PCP      Hypokalemia  Admit potassium was 3.3.    - Replace per protocol.    Obesity  Admit BMI 33.25. Contributes to morbidity and mortality       Diet: NPO for Medical/Clinical Reasons Except for: Meds    DVT Prophylaxis: Low Risk/Ambulatory with no VTE prophylaxis indicated  Dial Catheter: Not present  Central Lines: None  Cardiac Monitoring: None  Code Status: Full Code      Disposition Plan      Expected Discharge Date: 09/21/2022                The patient's care was discussed with the Attending Physician, Dr. Grupo Ornelas and Patient.    Deanna Cardenas PA-C  Hospitalist Service  [facility]  Securely message with the Vocera Web Console (learn more here)  Text page via [employer] Paging/Directory         ______________________________________________________________________    Interval History     Feeling \"pretty good\" this morning, but didn't get much sleep overnight.  Pain currently rated 2/10 at rest, but still has pain with movement of left middle finger. No numbness/tingling/burning.   At times pain migrates around to different locations on left middle finger and left palm.  Pain stops at mid palm, no radiation into wrist or forearm.  No pain elsewhere.     Blood pressure remains elevated but patient asymptomatic.   Patient has been prescribed antihypertensive medications in the past, but stopped taking them a few years ago when his prescription ran out.     No difficulty urinating.   No bowel movement or flatus yet " today.  No abdominal pain, nausea, vomiting.   Had a poor appetite at home which was new.     Some chills during the day on 9/17, then diaphoresis that night, now resolved.    Denies headache, dizziness, vision changes, ringing in ears, chest pain, palpitations, cough, shortness of breath, orthopnea, nausea, vomiting.       Data reviewed today: I reviewed all medications, new labs and imaging results over the last 24 hours. I personally reviewed no images or EKG's today.    Physical Exam   Vital Signs: Temp: 98.3  F (36.8  C) Temp src: Oral BP: (!) 184/110 Pulse: 82   Resp: 20 SpO2: 97 % O2 Device: None (Room air)    Weight: 231 lbs 11.26 oz    General appearance: Awake, alert, and in no apparent distress. Pleasant and conversational, speaking in full sentences.  CV: Regular rhythm & rate, no murmurs. No edema. Peripheral pulses intact.  Respiratory: Non-labored breathing on room air. Moving air well bilaterally, no wheezing, crackles, or rhonchi.  GI: Non-distended, soft, nontender to palpation. No rebound or guarding. Normoactive bowel sounds.  Skin: Warm, dry, no ecchymoses. No mottling of skin. Erythema and edema of left third digit from finger tip to base of finger, no obvious open wound or active bleeding / exudate.  Musculoskeletal / extremities: Moves all extremities equally, no obvious abnormalities. Distal CMS intact.  Neurologic: No focal deficits.      Data   Recent Labs   Lab 09/20/22  1225 09/20/22  0804 09/20/22  0008 09/19/22  2136   WBC  --   --   --  8.6   HGB  --   --   --  16.2   MCV  --   --   --  85   PLT  --   --   --  245   NA  --   --   --  139   POTASSIUM  --   --   --  3.3*   CHLORIDE  --   --   --  99   CO2  --   --   --  27   BUN  --   --   --  15.4   CR  --   --   --  0.93   ANIONGAP  --   --   --  13   COLE  --   --   --  8.9   * 86 101* 95     Recent Results (from the past 24 hour(s))   XR Hand Left G/E 3 Views    Narrative    EXAM: XR HAND LEFT G/E 3 VIEWS  LOCATION: [facility]  DATE/TIME: 9/19/2022 8:28 PM    INDICATION: concern for foreign body in left middle finger with swelling in the hand  COMPARISON: None.      Impression    IMPRESSION: No fracture or foreign body. Moderate circumferential soft tissue swelling involving the proximal portions of the second through fourth digits.     Medications     sodium chloride 125 mL/hr at 09/20/22 1010       amLODIPine  5 mg Oral Daily     ampicillin-sulbactam  3 g Intravenous Q6H     lisinopril  10 mg Oral Daily     vancomycin  1,250 mg Intravenous Q12H

## 2022-09-20 NOTE — CONSULTS
[facility] Consultation    Consultation - [facility]  Level of consult: Consult, follow and place orders    Nicholas Paniagua,  1965, MRN [id number]     Admitting Dx: Tenosynovitis, suppurative [M65.88]  Uncontrolled hypertension [I10]     PCP: Dejuan Cintron, [phone]     Code status:  Full Code     Extended Emergency Contact Information  Primary Emergency Contact: YUDY PANIAGUA, [facility]  Home Phone: [phone]  Relation: Father     Assessment:  Left middle finger and hand swelling and cellulitis, possible infectious flexor tenosynovitis    Plan:  This patient was discussed with Dr.Ryan Vargas, on-call surgeon for [facility] and they are in agreement with the following plan.   Agree with MRI of the left hand w/wo contrast to further evaluate for flexor tenosynovitis and abscess. Scheduling in process, discussed with RN.   Pending MRI review, may require surgical I&D later today.  has discussed with Dr.Erik Stephens who would be available for surgery later this afternoon.   Continue NPO.  Encourage elevation, pain control prn.     Thank you for including [facility] in the care of Nicholas Paniagua. It has been a pleasure participating in his care.      Bryan Donohue PA-C  [facility]    Active Problems:    Tenosynovitis, suppurative    Uncontrolled hypertension       Chief Complaint  Left middle finger and hand swelling, pain     HPI  The patient is seen in orthopedic consultation at the request of Dr.Scott Vidal.  The patient is a 57 year old RHD male with moderate swelling and pain of the left middle finger and hand. He has been working with [employer] over the last 2 weeks and last Friday noted pain and swelling in the dorsal distal phalanx of the left middle finger. He denies any significant injury during his work but has had several small cuts. He soaked it and was able to remove  several fiberglass splinters. He continued to have swelling in the finger and remove a few more from the dorsal finger later, but the swelling increased proximally and extended into the palm. Yesterday he began having more swelling in the dorsal hand with increased tightness and stiffness, prompting him to present to the [facility]. He was found to have significantly elevated blood pressure in addition to significant left hand swelling and was sent to the [facility] ED. He reported mild fever and chills to the ED physician as well. Xrays were taken which were negative for fracture or a foreign body, he was started on IV vancomycin and admitted for further treatment of both the left hand and his elevated blood pressures. Today he reports that the hand feels about the same, maybe a little less tight. He denies numbness or tingling in the digits. He has difficulty moving the left MF at all due to swelling and has decreased wrist ROM as well. The IF and RF tips also feel slightly painful and are more sensitive. He feels increased pain and tightness in the palm and palmar middle finger. This worsens with movement.      History is obtained from the patient and electronic health record     Past Medical History  No past medical history on file.    Surgical History  No past surgical history on file.     Social History  Social History     Socioeconomic History     Marital status: Single     Spouse name: Not on file     Number of children: Not on file     Years of education: Not on file     Highest education level: Not on file   Occupational History     Not on file   Tobacco Use     Smoking status: Never Smoker     Smokeless tobacco: Never Used   Substance and Sexual Activity     Alcohol use: Yes     Comment: occ     Drug use: No     Sexual activity: Never   Other Topics Concern     Parent/sibling w/ CABG, MI or angioplasty before 65F 55M? No   Social History Narrative     Not on file     Social  Determinants of Health     Financial Resource Strain: Not on file   Food Insecurity: Not on file   Transportation Needs: Not on file   Physical Activity: Not on file   Stress: Not on file   Social Connections: Not on file   Intimate Partner Violence: Not on file   Housing Stability: Not on file       Family History  Family History   Problem Relation Age of Onset     Cancer Mother 58        liver CA     Hypertension Mother      Hypertension Father      Cerebrovascular Disease Paternal Grandmother      Asthma No family hx of      C.A.D. No family hx of      Diabetes No family hx of      Prostate Cancer No family hx of      Cancer - colorectal No family hx of         Allergies:  Patient has no known allergies.      Current Medications:  Current Facility-Administered Medications   Medication     acetaminophen (TYLENOL) tablet 650 mg    Or     acetaminophen (TYLENOL) solution 640 mg     amLODIPine (NORVASC) tablet 5 mg     ampicillin-sulbactam (UNASYN) 3 g vial to attach to  mL bag     glucose gel 15-30 g    Or     dextrose 50 % injection 25-50 mL    Or     glucagon injection 1 mg     hydrALAZINE (APRESOLINE) injection 10-20 mg     HYDROmorphone (PF) (DILAUDID) injection 0.3-0.5 mg     labetalol (NORMODYNE/TRANDATE) injection 10 mg     lisinopril (ZESTRIL) tablet 10 mg     naloxone (NARCAN) injection 0.2 mg    Or     naloxone (NARCAN) injection 0.4 mg    Or     naloxone (NARCAN) injection 0.2 mg    Or     naloxone (NARCAN) injection 0.4 mg     ondansetron (ZOFRAN ODT) ODT tab 4 mg    Or     ondansetron (ZOFRAN) injection 4 mg     oxyCODONE (ROXICODONE) tablet 5-10 mg     polyethylene glycol (MIRALAX) Packet 17 g     prochlorperazine (COMPAZINE) injection 10 mg    Or     prochlorperazine (COMPAZINE) tablet 10 mg    Or     prochlorperazine (COMPAZINE) suppository 25 mg     senna-docusate (SENOKOT-S/PERICOLACE) 8.6-50 MG per tablet 1 tablet    Or     senna-docusate (SENOKOT-S/PERICOLACE) 8.6-50 MG per tablet 2 tablet      senna-docusate (SENOKOT-S/PERICOLACE) 8.6-50 MG per tablet 1 tablet    Or     senna-docusate (SENOKOT-S/PERICOLACE) 8.6-50 MG per tablet 2 tablet     sodium chloride 0.9% infusion     vancomycin (VANCOCIN) 1,250 mg in sodium chloride 0.9 % 250 mL intermittent infusion       Review of Systems:  See H&P    Physical Exam:  Temp:  [98.3  F (36.8  C)-98.9  F (37.2  C)] 98.3  F (36.8  C)  Pulse:  [] 82  Resp:  [10-48] 20  BP: (164-256)/() 184/110  SpO2:  [93 %-99 %] 97 %    General: On examination, the patient is resting comfortably, NAD, awake and alert and oriented to person, place, time, and and general circumstances  SKIN: Diffuse swelling throughout the left MF with erythema, extending proximally past the palmar crease. Discoloration at the palmar MF proximal and middle phalanges with multiple small closed wounds laterally. Multiple small closed wounds at the dorsal distal phalanx of MF. Mild diffuse softer swelling over the dorsal left hand.   Pulses:  radial pulse is intact and equal bilaterally  Sensation: intact and equal bilaterally to the distal upper extremities.  Tenderness: TTP over the palmar distal left metacarpal and MCP, diffusely through the palmar left MF. Mild TTP over the dorsal left MF and dorsal left hand. Slight TTP over the left IF and RF tips.   ROM: Active ROM of the left MF severely limited due to swelling, able to slightly flex and extend at the left 3rd MCP with pain. ROM intact at other fingers and thumb with slight restriction due to swelling in the MF. Left wrist flexion and extension decreased in range with increased pain. Passive ROM of the left MF limited due to stiffness, mildly increased pain with flexion and extension at the MCP.      Pertinent Labs  Lab Results: personally reviewed.  Lab Results   Component Value Date    WBC 8.6 09/19/2022    HGB 16.2 09/19/2022    HCT 48.7 09/19/2022    MCV 85 09/19/2022     09/19/2022       Pertinent Radiology  Radiology  Results: images and radiology report reviewed  Recent Results (from the past 24 hour(s))   XR Hand Left G/E 3 Views    Narrative    EXAM: XR HAND LEFT G/E 3 VIEWS  LOCATION: [facility]  DATE/TIME: 9/19/2022 8:28 PM    INDICATION: concern for foreign body in left middle finger with swelling in the hand  COMPARISON: None.      Impression    IMPRESSION: No fracture or foreign body. Moderate circumferential soft tissue swelling involving the proximal portions of the second through fourth digits.       Attestation:  I have reviewed today's vital signs, notes, medications, labs and imaging.     Bryan Donohue PA-C

## 2022-09-20 NOTE — ED NOTES
Pt presents to ED with concerns of injury from fiberglass panels-injury to left hand, swelling of LUE/hand/middle finger. Pt also reports hx of HTN, stopped using BP medications when medication ran out a few years ago-has not had BP recheck since that time.

## 2022-09-20 NOTE — INTERVAL H&P NOTE
"The History and Physical has been reviewed, the patient has been examined and no changes have occurred in the patient's condition since the H & P was completed.        Clinical Conditions Present on Arrival:  Clinically Significant Risk Factors Present on Admission                   # Obesity: Estimated body mass index is 33.25 kg/m  as calculated from the following:    Height as of this encounter: 1.778 m (5' 10\").    Weight as of this encounter: 105.1 kg (231 lb 11.3 oz).       "

## 2022-09-20 NOTE — PLAN OF CARE
Goal Outcome Evaluation:    End Of Shift Note        Plan: Continue IV antibiotics and pain control    Subjective/Objective:    Neuro: A&O x4, denies headache, dizziness.    Cardiac: HTN, patient was prescribed BP meds but ran out a couple years ago so stopped taking no recent follow up with PCP.  PRN lebatolol administered to maintain SBP <200, denies chest pain, 2+ edema in L. Foot.    Resp: LS clear, equal BILAT, SpO2 high 90's on room air, no cough.    GI/: WDL, last BM today 09/20/22.    MSK: SBA, steady gait observed, pain in L. Hand and fingers, dominant in L. 3rd digit.    Skin:  Erythema, swelling, and heat in L. Hand and fingers, redness and warmth in L. Palm.    LDAs: PIV R. AC      S/p I&D of L. 3rd digit  Arrived back to unit at 1700 from [facility]. Small abrasion/puncutre on anterior aspect of L. Shoulder no other new skin concerns confirmed by Ed RAMEY.

## 2022-09-20 NOTE — ANESTHESIA PREPROCEDURE EVALUATION
Anesthesia Pre-Procedure Evaluation    Patient: Nicholas Martinez   MRN: [id number] : 1965        Procedure : Procedure(s):  Irrigation and  Debridement Left Long Finger          No past medical history on file.   No past surgical history on file.   No Known Allergies   Social History     Tobacco Use     Smoking status: Never Smoker     Smokeless tobacco: Never Used   Substance Use Topics     Alcohol use: Yes     Comment: occ      Wt Readings from Last 1 Encounters:   22 105.1 kg (231 lb 11.3 oz)        Anesthesia Evaluation   Pt has had prior anesthetic.         ROS/MED HX  ENT/Pulmonary:     (+) MARIA VICTORIA risk factors, hypertension,     Neurologic:  - neg neurologic ROS     Cardiovascular:     (+) Dyslipidemia hypertension-----    METS/Exercise Tolerance:     Hematologic:  - neg hematologic  ROS     Musculoskeletal:   (+) arthritis,     GI/Hepatic:  - neg GI/hepatic ROS     Renal/Genitourinary:  - neg Renal ROS     Endo:     (+) Obesity,     Psychiatric/Substance Use:  - neg psychiatric ROS     Infectious Disease:  - neg infectious disease ROS     Malignancy:  - neg malignancy ROS     Other:  - neg other ROS          Physical Exam    Airway        Mallampati: II   TM distance: > 3 FB   Neck ROM: full   Mouth opening: > 3 cm    Respiratory Devices and Support         Dental  no notable dental history         Cardiovascular   cardiovascular exam normal          Pulmonary   pulmonary exam normal                OUTSIDE LABS:  CBC:   Lab Results   Component Value Date    WBC 8.6 2022    WBC 6.1 2018    HGB 16.2 2022    HGB 17.9 (H) 2018    HCT 48.7 2022    HCT 53.9 (H) 2018     2022     2018     BMP:   Lab Results   Component Value Date     2022     2018    POTASSIUM 3.3 (L) 2022    POTASSIUM 3.6 2018    CHLORIDE 99 2022    CHLORIDE 104 2018    CO2 27 2022    CO2 30 2018    BUN 15.4 2022     BUN 13 12/17/2018    CR 0.93 09/19/2022    CR 0.97 12/17/2018     (H) 09/20/2022    GLC 86 09/20/2022     COAGS: No results found for: PTT, INR, FIBR  POC: No results found for: BGM, HCG, HCGS  HEPATIC:   Lab Results   Component Value Date    ALBUMIN 4.3 12/17/2018    PROTTOTAL 7.7 12/17/2018    ALT 25 12/17/2018    AST 24 12/17/2018    ALKPHOS 109 12/17/2018    BILITOTAL 0.8 12/17/2018     OTHER:   Lab Results   Component Value Date    COLE 8.9 09/19/2022    TSH 2.10 09/19/2022       Anesthesia Plan    ASA Status:  3      Anesthesia Type: General.   Induction: Propofol.   Maintenance: TIVA.        Consents    Anesthesia Plan(s) and associated risks, benefits, and realistic alternatives discussed. Questions answered and patient/representative(s) expressed understanding.     - Discussed: Risks, Benefits and Alternatives for BOTH SEDATION and the PROCEDURE were discussed     - Discussed with:  Patient         Postoperative Care    Pain management: IV analgesics, Oral pain medications, Peripheral nerve block (Single Shot), Multi-modal analgesia.   PONV prophylaxis: Ondansetron (or other 5HT-3), Dexamethasone or Solumedrol     Comments:                PATRICIA Banks CRNA

## 2022-09-20 NOTE — ED PROVIDER NOTES
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 Swelling is going into hand. Patient reports fevers.      Hypertension     HPI  Nicholas Martinez is a 57 year old male who presents with middle finger on his left hand.  He has been working with [employer] for the last 3 weeks.  Over this time and recently over the last few days has developed erythema swelling of the left middle finger now descending into the hand.  Also with obviously poorly controlled blood pressures.  Past history of left ventricular hypertrophy.  Swelling and pain have been progressive over the last 24 hours with decreased range of motion.  Possible tactile warmth and chills at home.    Allergies:  No Known Allergies    Problem List:    Patient Active Problem List    Diagnosis Date Noted     Essential hypertension with goal blood pressure less than 130/80 10/25/2011     Priority: Medium        Past Medical History:    No past medical history on file.    Past Surgical History:    No past surgical history on file.    Family History:    Family History   Problem Relation Age of Onset     Cancer Mother 58        liver CA     Hypertension Mother      Hypertension Father      Cerebrovascular Disease Paternal Grandmother      Asthma No family hx of      C.A.D. No family hx of      Diabetes No family hx of      Prostate Cancer No family hx of      Cancer - colorectal No family hx of        Social History:  Marital Status:  Single [1]  Social History     Tobacco Use     Smoking status: Never Smoker     Smokeless tobacco: Never Used   Substance Use Topics     Alcohol use: Yes     Comment: occ     Drug use: No        Medications:    amLODIPine (NORVASC) 2.5 MG tablet  metoprolol succinate ER (TOPROL-XL) 200 MG 24 hr tablet  triamterene-HCTZ (MAXZIDE) 75-50 MG tablet          Review of Systems   Constitutional: Positive for chills. Negative for diaphoresis and  fever.   HENT: Negative for ear pain, sinus pressure and sore throat.    Eyes: Negative for visual disturbance.   Respiratory: Negative for cough, shortness of breath and wheezing.    Cardiovascular: Negative for chest pain and palpitations.   Gastrointestinal: Negative for abdominal pain, blood in stool, constipation, diarrhea, nausea and vomiting.   Genitourinary: Negative for dysuria, frequency and urgency.   Musculoskeletal: Positive for joint swelling.   Skin: Negative for rash.   Neurological: Negative for headaches.   All other systems reviewed and are negative.      Physical Exam   BP: (!) 256/142  Pulse: 115  Temp: 98.4  F (36.9  C)  Resp: 16  Weight: 108.9 kg (240 lb)  SpO2: 98 %      Physical Exam  Constitutional:       General: He is in acute distress.      Appearance: He is not diaphoretic.   Eyes:      Conjunctiva/sclera: Conjunctivae normal.   Cardiovascular:      Rate and Rhythm: Normal rate and regular rhythm.      Heart sounds: No murmur heard.  Pulmonary:      Effort: No respiratory distress.      Breath sounds: No stridor. No wheezing.   Abdominal:      General: Abdomen is flat. There is no distension.      Palpations: Abdomen is soft. There is no mass.      Tenderness: There is no abdominal tenderness. There is no guarding.   Musculoskeletal:      Cervical back: Neck supple.      Right lower leg: No edema.      Left lower leg: No edema.   Skin:     Findings: Rash present.   Neurological:      Mental Status: He is alert.            The middle finger is quite swollen and has decreased range of motion tender both over the volar and dorsal aspects.  There is erythema that extends from the base of the finger MCP joint extending over the dorsal hand.  There is no obvious open wound.                      ED Course               EKG Interpretation:      Interpreted by Christopher Vidal MD  EKG done at 2251 hrs. demonstrates a sinus rhythm at 99 bpm normal axis.  No ST change.  No T wave changes.  Normal R  progression and no Q waves.  Normal intervals.  Normal conduction.  No ectopy.  Impression sinus rhythm 99 bpm no significant acute changes.         Procedures              Critical Care time:  none                  Results for orders placed or performed during the hospital encounter of 09/19/22 (from the past 24 hour(s))   XR Hand Left G/E 3 Views    Narrative    EXAM: XR HAND LEFT G/E 3 VIEWS  LOCATION: [facility]  DATE/TIME: 9/19/2022 8:28 PM    INDICATION: concern for foreign body in left middle finger with swelling in the hand  COMPARISON: None.      Impression    IMPRESSION: No fracture or foreign body. Moderate circumferential soft tissue swelling involving the proximal portions of the second through fourth digits.       Medications   labetalol (NORMODYNE) tablet 100 mg (has no administration in time range)   ceFAZolin (ANCEF) intermittent infusion 2 g in 100 mL dextrose PRE-MIX (has no administration in time range)   sulfamethoxazole-trimethoprim (BACTRIM DS) 800-160 MG per tablet 1 tablet (has no administration in time range)   Tdap (tetanus-diphtheria-acell pertussis) (ADACEL) injection 0.5 mL (has no administration in time range)       Assessments & Plan (with Medical Decision Making)       MDM: Nicholas Martinez is a 57 year old male who presents with tenosynovitis of the left hand middle finger.  Findings are most consistent with suppurative tenosynovitis.  Plan for admission on antibiotics.  I spoke with Dr. Brant Vargas.  He agreed with the recommendation for hospital admission.  Recommended IV vancomycin as single agent.  That is been initiated.  Patient also has uncontrolled severe hypertension.  He is asymptomatic of this.  He may have sleep apnea he has no significant alcohol abuse.  Is previously on antihypertensives and a history of LVH but is no longer on any antihypertensives.  Have initiated initial labetalol and then once labs were back added lisinopril.  Discussed  with Dr. Tejada  Will watch blood pressures overnight and advance meds as needed.  He will be in a border in the ICU and could be stepped up if additional meds were needed.    Dr. Vargas also recommends an MRI of the hand.       I have reviewed the nursing notes.    I have reviewed the findings, diagnosis, plan and need for follow up with the patient.       ED to Inpatient Handoff:    Discussed with Dr. Tejada    Patient accepted for Inpatient Stay  Pending studies include none  Code Status: Full Code           New Prescriptions    No medications on file       Final diagnoses:   Uncontrolled hypertension   Tenosynovitis, suppurative       9/19/2022   [facility] EMERGENCY DEPT     Christopher Vidal MD  09/19/22 [id number]

## 2022-09-20 NOTE — ANESTHESIA CARE TRANSFER NOTE
Patient: Nicholas Martinez    Procedure: Procedure(s):  Irrigation and  Debridement Left Long Finger       Diagnosis: Stenosing tenosynovitis of finger of left hand [M65.842]  Diagnosis Additional Information: No value filed.    Anesthesia Type:   General     Note:    Oropharynx: spontaneously breathing  Level of Consciousness: awake  Oxygen Supplementation: room air    Independent Airway: airway patency satisfactory and stable  Dentition: dentition unchanged  Vital Signs Stable: post-procedure vital signs reviewed and stable  Report to RN Given: handoff report given  Patient transferred to: Phase II    Handoff Report: Identifed the Patient, Identified the Reponsible Provider, Reviewed the pertinent medical history, Discussed the surgical course, Reviewed Intra-OP anesthesia mangement and issues during anesthesia, Set expectations for post-procedure period and Allowed opportunity for questions and acknowledgement of understanding      Vitals:  Vitals Value Taken Time   /78 09/20/22 1823   Temp 36.7  C (98.1  F) 09/20/22 1823   Pulse 80 09/20/22 1823   Resp 14 09/20/22 1823   SpO2 97 % 09/20/22 1827   Vitals shown include unvalidated device data.    Electronically Signed By: PATRICIA Perry CRNA  September 20, 2022  6:28 PM

## 2022-09-20 NOTE — ED PROVIDER NOTES
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 Swelling is going into hand. Patient reports fevers.      HPI  Nicholas Martinez is a 57 year old male with a past medical history of hypertension who presents for evaluation of left hand pain and swelling which began on September 17, 2022.  States that he was handling some new fiberglass panels at work and subsequently developed pain and swelling in the left middle finger.  Over the past 24 hours, he has developed swelling in the left hand and the left middle finger has become more discolored and swollen.  He is unable to bend the left finger due to pain.  States that he also has fevers and chills but has not checked his temperature at home.  Admits to drinking a few alcoholic beverages every week.    Allergies:  No Known Allergies    Problem List:    Patient Active Problem List    Diagnosis Date Noted     Essential hypertension with goal blood pressure less than 130/80 10/25/2011     Priority: Medium        Past Medical History:    No past medical history on file.    Past Surgical History:    No past surgical history on file.    Family History:    Family History   Problem Relation Age of Onset     Cancer Mother 58        liver CA     Hypertension Mother      Hypertension Father      Cerebrovascular Disease Paternal Grandmother      Asthma No family hx of      C.A.D. No family hx of      Diabetes No family hx of      Prostate Cancer No family hx of      Cancer - colorectal No family hx of        Social History:  Marital Status:  Single [1]  Social History     Tobacco Use     Smoking status: Never Smoker     Smokeless tobacco: Never Used   Substance Use Topics     Alcohol use: Yes     Comment: occ     Drug use: No        Medications:    amLODIPine (NORVASC) 2.5 MG tablet  metoprolol succinate ER (TOPROL-XL) 200 MG 24 hr tablet  triamterene-HCTZ (MAXZIDE) 75-50 MG  tablet      Review of Systems   Constitutional: Positive for chills and fever.   Respiratory: Negative.    Cardiovascular: Negative.    Musculoskeletal: Positive for joint swelling.   Skin: Positive for color change and wound.   Neurological: Negative.        Physical Exam   BP: (!) 256/142  Pulse: 115  Temp: 98.4  F (36.9  C)  Resp: 16  Weight: 108.9 kg (240 lb)  SpO2: 98 %      Physical Exam  Constitutional:       General: He is not in acute distress.     Appearance: Normal appearance. He is not ill-appearing, toxic-appearing or diaphoretic.   Cardiovascular:      Pulses: Normal pulses.   Musculoskeletal:         General: Swelling and tenderness present.      Left hand: Swelling and tenderness present. No deformity or lacerations. Decreased range of motion. Normal strength. Normal capillary refill.      Cervical back: Normal range of motion and neck supple. No rigidity. No muscular tenderness.      Comments: Moderate swelling of the third digit, left hand.  Third digit is erythematous and tender to touch.  No fluctuant masses.  Limited range of motion of the third digit, left hand.  There is mild swelling and erythema in the palm of the left hand as well as on the dorsal aspect of the left hand.   Skin:     General: Skin is warm and dry.      Findings: Erythema present. No ecchymosis, laceration, lesion or rash.      Comments: Third digit, left hand is erythematous with some blanched areas especially on the palmar surface of the finger.   Neurological:      Mental Status: He is alert and oriented to person, place, and time.      Sensory: No sensory deficit.                       ED Course                 Procedures                                                                                                                                                                     Results for orders placed or performed during the hospital encounter of 09/19/22 (from the past 24 hour(s))   XR Hand Left G/E 3 Views     Narrative    EXAM: XR HAND LEFT G/E 3 VIEWS  LOCATION: [facility]  DATE/TIME: 9/19/2022 8:28 PM    INDICATION: concern for foreign body in left middle finger with swelling in the hand  COMPARISON: None.      Impression    IMPRESSION: No fracture or foreign body. Moderate circumferential soft tissue swelling involving the proximal portions of the second through fourth digits.       Medications - No data to display    Assessments & Plan (with Medical Decision Making)     No foreign bodies evident on x-ray of the left hand.  I am concerned about the potential for an acutely worsening infection in the left hand, recommended transfer the patient to the emergency department for further evaluation and management.  The patient also has significantly elevated blood pressure 240s over 130s to 140s.  He denies any headaches or acute vision changes, no chest pain or shortness of breath.  When questioned, he feels that his blood pressure is elevated due to stress and working long hours.  May also be elevated due to pain.    Discussed that he may need further work-up and management which exceeds the capabilities of urgent care.  The patient agrees and is willing to be transferred.  I have reviewed the nursing notes.    I have reviewed the findings, diagnosis, plan and need for follow up with the patient.      New Prescriptions    No medications on file       Final diagnoses:   Pain of finger of left hand       9/19/2022   [facility] EMERGENCY DEPT     Adria Oneill PA-C  09/19/22 2068

## 2022-09-20 NOTE — H&P
"[facility]    History and Physical - Hospitalist Service       Date of Admission:  9/19/2022    Assessment & Plan   Nicholas Martinez is a 57 year old male admitted on 9/19/2022. He is admitted with tenosynovitis of his left hand.    Left hand tenosynovitis  Left hand x-ray series shows no fractures or foreign bodies but moderate circumferential soft tissue swelling involving the proximal portions of the second through fourth digits.  The patient was started on vancomycin.  -MRI of the left hand  -Consult orthopedic surgery  -Continue vancomycin and add Unasyn  -Titrate antibiotics based on Gram smear, cultures, and nasal MRSA PCR  -Pain control with acetaminophen, oxycodone, and IV hydromorphone  -N.p.o. overnight in preparation for surgery    Hypertensive urgency  Blood pressure on arrival to the emergency department was 256/142 mmHg.  The patient denies headaches, chest pain, or shortness of breath.  ECG demonstrates no evidence of myocardial ischemia.  The patient was given labetalol 100 mg and lisinopril 10 mg in the emergency department.  -Admit the patient to the ICU for close neurologic hemodynamic monitoring  -Neurochecks every 4 hours  -IV labetalol and IV hydralazine to maintain blood pressures less than 200/110 mmHg  -Start amlodipine 5 mg and lisinopril 10 mg  -UA with Urine toxicology pending  -Consider sleep study  -Follow up with PCP     Hypokalemia  Potassium was 3.3.  Replace per protocol.    Clinically Significant Risk Factors Present on Admission                    # Obesity: Estimated body mass index is 33.47 kg/m  as calculated from the following:    Height as of this encounter: 1.803 m (5' 11\").    Weight as of this encounter: 108.9 kg (240 lb).           Diet:  N.p.o.  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 Swelling is going into hand. Patient reports fevers.      Hypertension        History is obtained from the patient    History of Present Illness   Nicholas Martinez is a 57 year old male who presented to the emergency department with left hand swelling.  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  On Friday, he noted that his left middle finger was swollen.  He soaked the hand and was able to pull out several fiberglass splinters.  On Saturday, he noted that the dorsum and palmar surface of his hand were swollen and red.  Today, he has developed sweats and chills, so he presented to the emergency department for further evaluation.  The patient denies nasal congestion, runny nose, sore throat, wheezing, cough, or shortness of breath.  Patient denies chest pain, chest pressure, palpitations, lightheadedness, or dizziness.  Patient denies syncope or falls.  Patient denies nausea, vomiting, diarrhea, melena, hematochezia, constipation, or abdominal pain.  Patient denies dysuria or hematuria.  Patient denies rash, muscle aches, joint pain, or edema.  Patient denies headache, neck pain, or back pain.  Patient denies diplopia, dysarthria, dysphagia, incoordination, focal numbness, or unilateral weakness.  Patient denies tea colored urine.  He has no family history of premature coronary artery disease, strokes, renal failure.  He denies illicit drug use.    Review of Systems    The 10 point Review of Systems is negative other than noted in the HPI or here.     Past Medical History    I have reviewed this patient's medical history and updated it with pertinent information  if needed.   No past medical history on file.    Patient Active Problem List    Diagnosis Date Noted     Tenosynovitis, suppurative 09/19/2022     Priority: Medium     Uncontrolled hypertension 09/19/2022     Priority: Medium     Essential hypertension with goal blood pressure less than 130/80 10/25/2011     Priority: Medium        Past Surgical History   I have reviewed this patient's surgical history and updated it with pertinent information if needed.  No past surgical history on file.    Social History   I have reviewed this patient's social history and updated it with pertinent information if needed.  Social History     Tobacco Use     Smoking status: Never Smoker     Smokeless tobacco: Never Used   Substance Use Topics     Alcohol use: Yes     Comment: occ     Drug use: No       Family History   I have reviewed this patient's family history and updated it with pertinent information if needed.   Family History   Problem Relation Age of Onset     Cancer Mother 58        liver CA     Hypertension Mother      Hypertension Father      Cerebrovascular Disease Paternal Grandmother      Asthma No family hx of      C.A.D. No family hx of      Diabetes No family hx of      Prostate Cancer No family hx of      Cancer - colorectal No family hx of        Prior to Admission Medications   Prior to Admission Medications   Prescriptions Last Dose Informant Patient Reported? Taking?   amLODIPine (NORVASC) 2.5 MG tablet   No No   Sig: Take 1 tablet (2.5 mg) by mouth daily   metoprolol succinate ER (TOPROL-XL) 200 MG 24 hr tablet   No No   Sig: Take 1 tablet (200 mg) by mouth daily   triamterene-HCTZ (MAXZIDE) 75-50 MG tablet   No No   Sig: Take 1 tablet by mouth daily      Facility-Administered Medications: None     Allergies   No Known Allergies    Physical Exam   Vital Signs: Temp: 98.4  F (36.9  C) Temp src: Oral BP: (!) 197/139 Pulse: 103   Resp: 16 SpO2: 98 % O2 Device: None (Room air)    Weight: 240 lbs 0 oz    Gen: Well  nourished, well developed, resting comfortably in bed, no acute distress  Head: atraumatic normocephalic; sclera non-injected, anicterric; oral mucosa moist, no lesions, no exudates, normal dentition  Neck: supple without spinal abnormality  Chest: clear to auscultation bilaterally, no wheezes, no rhonchi, no rales.  Cardiovascular: regular rate and rhythm, no gallops or rubs, no murmurs, no edema  Abdomen: bowel sounds normal, no hepatosplenomegaly, no masses, non-tender, non-distended, no guarding, no rebound tenderness  Musculoskeletal: normal muscle mass, normal muscle tone  Skin: no rashes, no chronic venous stasis, erythema and induration of the left third finger, extending to the second third and fourth fingers as well as the dorsum and distal plantar surface of the left hand.  Lymph: no lymphadenopathy.  Neuro: cranial nerves II-XII intact, strength in all four extremities normal, reflexes normal, coordination normal.    Data   Data reviewed today: I reviewed all medications, new labs and imaging results over the last 24 hours. I personally reviewed the EKG tracing showing Normal sinus rhythm.    Recent Labs   Lab 09/19/22  2136   WBC 8.6   HGB 16.2   MCV 85         POTASSIUM 3.3*   CHLORIDE 99   CO2 27   BUN 15.4   CR 0.93   ANIONGAP 13   COLE 8.9   GLC 95         Recent Results (from the past 24 hour(s))   XR Hand Left G/E 3 Views    Narrative    EXAM: XR HAND LEFT G/E 3 VIEWS  LOCATION: [facility]  DATE/TIME: 9/19/2022 8:28 PM    INDICATION: concern for foreign body in left middle finger with swelling in the hand  COMPARISON: None.      Impression    IMPRESSION: No fracture or foreign body. Moderate circumferential soft tissue swelling involving the proximal portions of the second through fourth digits.

## 2022-09-20 NOTE — BRIEF OP NOTE
[facility]    Brief Operative Note    Pre-operative diagnosis: Stenosing tenosynovitis of finger of left hand [M65.842]  Post-operative diagnosis Same as pre-operative diagnosis    Procedure: Procedure(s):  Irrigation and  Debridement Left Long Finger  Surgeon: Surgeon(s) and Role:     * Vance Stephens MD - Primary  Anesthesia: General with Block   Estimated Blood Loss: Less than 50 ml    Drains: None  Specimens:   ID Type Source Tests Collected by Time Destination   A : left hand flexor tendon Abscess Hand, Left ANAEROBIC BACTERIAL CULTURE ROUTINE, AEROBIC BACTERIAL CULTURE ROUTINE Vance Stephens MD 9/20/2022  6:00 PM      Findings:   None.  Complications: None.  Implants: * No implants in log *     Cont unasyn pending culture results  Elevate hand as much as possible  Follow up in 1 week for recheck    Vance Stephens MD

## 2022-09-20 NOTE — PHARMACY-VANCOMYCIN DOSING SERVICE
Pharmacy Vancomycin Initial Note  Date of Service 2022  Patient's  1965  57 year old, male    Indication: Skin and Soft Tissue Infection    Current estimated CrCl = Estimated Creatinine Clearance: 109.9 mL/min (based on SCr of 0.93 mg/dL).    Creatinine for last 3 days  2022:  9:36 PM Creatinine 0.93 mg/dL    Recent Vancomycin Level(s) for last 3 days  No results found for requested labs within last 72 hours.      Vancomycin IV Administrations (past 72 hours)                   vancomycin (VANCOCIN) 1,750 mg in sodium chloride 0.9 % 500 mL intermittent infusion (mg) 1,750 mg Given 22                Nephrotoxins and other renal medications (From now, onward)    Start     Dose/Rate Route Frequency Ordered Stop    22 1000  vancomycin (VANCOCIN) 1,250 mg in sodium chloride 0.9 % 250 mL intermittent infusion         1,250 mg  over 90 Minutes Intravenous EVERY 12 HOURS 22 2218      22 2150  vancomycin (VANCOCIN) 1,750 mg in sodium chloride 0.9 % 500 mL intermittent infusion         1,750 mg  over 2 Hours Intravenous ONCE 22 214            Contrast Orders - past 72 hours (72h ago, onward)    None          [employer] Prediction of Planned Initial Vancomycin Regimen  Loading dose: 1750mg ONCE  Regimen: 1250 mg IV every 12 hours.  Start time: 23:16 on 2022  Exposure target: AUC24 (range)400-600 mg/L.hr   AUC24,ss: 499 mg/L.hr  Probability of AUC24 > 400: 73 %  Ctrough,ss: 16.0 mg/L  Probability of Ctrough,ss > 20: 31 %  Probability of nephrotoxicity (Lodise KATELYN ): 11 %        Plan:  1. Start vancomycin  1750 mg IV once, then 1250mg q12h.   2. Vancomycin monitoring method: AUC  3. Vancomycin therapeutic monitoring goal: 400-600 mg*h/L  4. Pharmacy will check vancomycin levels as appropriate in 1-3 Days.    5. Serum creatinine levels will be ordered daily for the first week of therapy and at least twice weekly for subsequent weeks.      Kobi Polnaco,  RPH

## 2022-09-20 NOTE — ANESTHESIA PROCEDURE NOTES
Brachial plexus Procedure Note    Pre-Procedure   Staff -        CRNA: Jenna Betancourt APRN CRNA       Performed By: CRNA       Location: pre-op       Procedure Start/Stop Times: 9/20/2022 4:48 PM and 9/20/2022 5:07 PM       Pre-Anesthestic Checklist: patient identified, IV checked, site marked, risks and benefits discussed, informed consent, monitors and equipment checked, pre-op evaluation, at physician/surgeon's request and post-op pain management  Timeout:       Correct Patient: Yes        Correct Procedure: Yes        Correct Site: Yes        Correct Position: Yes        Correct Laterality: Yes        Site Marked: Yes  Procedure Documentation  Procedure: Brachial plexus       Diagnosis: PAIN       Laterality: left       Patient Position: supine       Patient Prep/Sterile Barriers: sterile gloves, mask, patient draped       Skin prep: Chloraprep       Local skin infiltrated with 3 mL of 1% lidocaine.  (supraclavicular approach).       Needle Type: insulated and short bevel       Needle Gauge: 22.        Needle Length (Inches): 4        Ultrasound guided       1. Ultrasound was used to identify targeted nerve, plexus, vascular marker, or fascial plane and place a needle adjacent to it in real-time.       2. Ultrasound was used to visualize the spread of anesthetic in close proximity to the above referenced structure.       3. A permanent image is entered into the patient's record.       4. The visualized anatomic structures appeared normal.       5. There were no apparent abnormal pathologic findings.    Assessment/Narrative         The placement was negative for: blood aspirated, painful injection and site bleeding       Paresthesias: No.       Test dose of 5 mL lidocaine 2% w/ 1:200,000 epinephrine at 17:02 CDT.         Test dose negative, 3 minutes after injection, for signs of intravascular, subdural, or intrathecal injection.       Bolus given via needle. no blood aspirated via catheter.        Secured  via.        Insertion/Infusion Method: Single Shot       Complications: none       Injection made incrementally with aspirations every 5 mL.    Medication(s) Administered   Medication Administration Time: 9/20/2022 4:48 PM

## 2022-09-20 NOTE — ED TRIAGE NOTES
Triage Assessment     Row Name 09/19/22 2104       Triage Assessment (Adult)    Airway WDL WDL       Respiratory WDL    Respiratory WDL WDL       Skin Circulation/Temperature WDL    Skin Circulation/Temperature WDL WDL       Cardiac WDL    Cardiac WDL X  hypertension       Peripheral/Neurovascular WDL    Peripheral Neurovascular WDL WDL       Cognitive/Neuro/Behavioral WDL    Cognitive/Neuro/Behavioral WDL WDL

## 2022-09-20 NOTE — ED NOTES
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 Swelling is going into hand. Patient reports fevers. ) and Hypertension  . The patient's current symptoms are new and during this time the symptoms have remained the same. In the ED, patient was diagnosed with   Final diagnoses:   Pain of finger of left hand         Needed?: No    Allergies:  No Known Allergies    Past Medical Hx: No past medical history on file.    Initial vitals were: BP: (!) 256/142  Pulse: 115  Temp: 98.4  F (36.9  C)  Resp: 16  Height: 180.3 cm (5' 11\")  Weight: 108.9 kg (240 lb)  SpO2: 98 %   Recent vital Signs: BP (!) 242/134   Pulse 115   Temp 98.4  F (36.9  C) (Oral)   Resp 16   Ht 1.803 m (5' 11\")   Wt 108.9 kg (240 lb)   SpO2 98%   BMI 33.47 kg/m      Elimination Status: Continent: Yes     Activity Level: Independent    Baseline Mental status: WDL  Current Mental Status changes: at basesline    Infection present or suspected this encounter: no    Sepsis suspected: No    Isolation type:     Bariatric equipment needed?: No    In the ED these meds were given:   Medications   labetalol (NORMODYNE) tablet 100 mg (100 mg Oral Given 9/19/22 2155)   vancomycin (VANCOCIN) 1,750 mg in sodium chloride 0.9 % 500 mL intermittent infusion (1,750 mg Intravenous Given 9/19/22 2209)   Tdap (tetanus-diphtheria-acell pertussis) (ADACEL) injection 0.5 mL (0.5 mLs Intramuscular Given 9/19/22 2155)       Drips running?  Yes    Home pump  No    Current LDAs: {INTRAVENOUS ACCESS:990076::\"Peripheral IV: Site right AC; Gauge 18  none       Results:   Labs/Imaging  Ordered and Resulted from Time of ED Arrival Up to the Time of Departure from the ED  Results for orders placed or performed during the hospital encounter of 09/19/22 (from the past " 24 hour(s))   XR Hand Left G/E 3 Views    Narrative    EXAM: XR HAND LEFT G/E 3 VIEWS  LOCATION: [facility]  DATE/TIME: 9/19/2022 8:28 PM    INDICATION: concern for foreign body in left middle finger with swelling in the hand  COMPARISON: None.      Impression    IMPRESSION: No fracture or foreign body. Moderate circumferential soft tissue swelling involving the proximal portions of the second through fourth digits.   CBC with platelets differential    Narrative    The following orders were created for panel order CBC with platelets differential.  Procedure                               Abnormality         Status                     ---------                               -----------         ------                     CBC with platelets and d...[[id number]]                      Final result                 Please view results for these tests on the individual orders.   Basic metabolic panel   Result Value Ref Range    Sodium 139 136 - 145 mmol/L    Potassium 3.3 (L) 3.4 - 5.3 mmol/L    Chloride 99 98 - 107 mmol/L    Carbon Dioxide (CO2) 27 22 - 29 mmol/L    Anion Gap 13 7 - 15 mmol/L    Urea Nitrogen 15.4 6.0 - 20.0 mg/dL    Creatinine 0.93 0.67 - 1.17 mg/dL    Calcium 8.9 8.6 - 10.0 mg/dL    Glucose 95 70 - 99 mg/dL    GFR Estimate >90 >60 mL/min/1.73m2   TSH with free T4 reflex   Result Value Ref Range    TSH 2.28 0.30 - 4.20 uIU/mL   CBC with platelets and differential   Result Value Ref Range    WBC Count 8.6 4.0 - 11.0 10e3/uL    RBC Count 5.72 4.40 - 5.90 10e6/uL    Hemoglobin 16.2 13.3 - 17.7 g/dL    Hematocrit 48.7 40.0 - 53.0 %    MCV 85 78 - 100 fL    MCH 28.3 26.5 - 33.0 pg    MCHC 33.3 31.5 - 36.5 g/dL    RDW 13.0 10.0 - 15.0 %    Platelet Count 245 150 - 450 10e3/uL    % Neutrophils 66 %    % Lymphocytes 23 %    % Monocytes 10 %    % Eosinophils 1 %    % Basophils 0 %    % Immature Granulocytes 0 %    NRBCs per 100 WBC 0 <1 /100    Absolute Neutrophils 5.7 1.6 - 8.3 10e3/uL     Absolute Lymphocytes 1.9 0.8 - 5.3 10e3/uL    Absolute Monocytes 0.9 0.0 - 1.3 10e3/uL    Absolute Eosinophils 0.1 0.0 - 0.7 10e3/uL    Absolute Basophils 0.0 0.0 - 0.2 10e3/uL    Absolute Immature Granulocytes 0.0 <=0.4 10e3/uL    Absolute NRBCs 0.0 10e3/uL         For the majority of the shift this patient's behavior was Green     Cardiac Rhythm: Normal Sinus  Pt needs tele? Yes  Skin/wound Issues: wound infection to middle finger on left hand    Code Status: Full Code    Pain control: fair    Nausea control: good    Abnormal labs/tests/findings requiring intervention:     Patient tested for COVID 19 prior to admission: YES     OBS brochure/video discussed/provided to patient/family: Yes     Family present during ED course? No     Family Comments/Social Situation comments:     Tasks needing completion: None    Gunjan Shearer RN

## 2022-09-20 NOTE — ANESTHESIA POSTPROCEDURE EVALUATION
Patient: Nicholas Martinez    Procedure: Procedure(s):  Irrigation and  Debridement Left Long Finger       Anesthesia Type:  General    Note:  Disposition: Outpatient   Postop Pain Control: Uneventful            Sign Out: Well controlled pain   PONV: No   Neuro/Psych: Uneventful            Sign Out: Acceptable/Baseline neuro status   Airway/Respiratory: Uneventful            Sign Out: Acceptable/Baseline resp. status   CV/Hemodynamics: Uneventful            Sign Out: Acceptable CV status; No obvious hypovolemia; No obvious fluid overload   Other NRE: NONE   DID A NON-ROUTINE EVENT OCCUR? No           Last vitals:  Vitals Value Taken Time   /78 09/20/22 1823   Temp 36.7  C (98.1  F) 09/20/22 1823   Pulse 80 09/20/22 1823   Resp 14 09/20/22 1823   SpO2 97 % 09/20/22 1828   Vitals shown include unvalidated device data.    Electronically Signed By: PATRICIA Perry CRNA  September 20, 2022  6:29 PM

## 2022-09-21 ENCOUNTER — APPOINTMENT (OUTPATIENT)
Dept: OCCUPATIONAL THERAPY | Facility: CLINIC | Age: 57
DRG: 514 | End: 2022-09-21
Payer: OTHER MISCELLANEOUS

## 2022-09-21 LAB
HGB BLD-MCNC: 15.2 G/DL (ref 13.3–17.7)
HOLD SPECIMEN: NORMAL
POTASSIUM SERPL-SCNC: 3.8 MMOL/L (ref 3.4–5.3)

## 2022-09-21 PROCEDURE — 250N000013 HC RX MED GY IP 250 OP 250 PS 637: Performed by: ORTHOPAEDIC SURGERY

## 2022-09-21 PROCEDURE — 84132 ASSAY OF SERUM POTASSIUM: CPT | Performed by: INTERNAL MEDICINE

## 2022-09-21 PROCEDURE — 250N000011 HC RX IP 250 OP 636: Performed by: ORTHOPAEDIC SURGERY

## 2022-09-21 PROCEDURE — 99231 SBSQ HOSP IP/OBS SF/LOW 25: CPT | Performed by: INTERNAL MEDICINE

## 2022-09-21 PROCEDURE — 36415 COLL VENOUS BLD VENIPUNCTURE: CPT | Performed by: INTERNAL MEDICINE

## 2022-09-21 PROCEDURE — 97165 OT EVAL LOW COMPLEX 30 MIN: CPT | Mod: GO | Performed by: OCCUPATIONAL THERAPIST

## 2022-09-21 PROCEDURE — 36415 COLL VENOUS BLD VENIPUNCTURE: CPT | Performed by: ORTHOPAEDIC SURGERY

## 2022-09-21 PROCEDURE — 97530 THERAPEUTIC ACTIVITIES: CPT | Mod: GO | Performed by: OCCUPATIONAL THERAPIST

## 2022-09-21 PROCEDURE — 85018 HEMOGLOBIN: CPT | Performed by: ORTHOPAEDIC SURGERY

## 2022-09-21 PROCEDURE — 120N000001 HC R&B MED SURG/OB

## 2022-09-21 RX ORDER — TRIAMTERENE/HYDROCHLOROTHIAZID 37.5-25 MG
2 TABLET ORAL DAILY
Status: DISCONTINUED | OUTPATIENT
Start: 2022-09-22 | End: 2022-09-22 | Stop reason: HOSPADM

## 2022-09-21 RX ORDER — LISINOPRIL 20 MG/1
20 TABLET ORAL DAILY
Status: DISCONTINUED | OUTPATIENT
Start: 2022-09-22 | End: 2022-09-22 | Stop reason: HOSPADM

## 2022-09-21 RX ADMIN — KETOROLAC TROMETHAMINE 15 MG: 15 INJECTION, SOLUTION INTRAMUSCULAR; INTRAVENOUS at 08:04

## 2022-09-21 RX ADMIN — KETOROLAC TROMETHAMINE 15 MG: 15 INJECTION, SOLUTION INTRAMUSCULAR; INTRAVENOUS at 14:56

## 2022-09-21 RX ADMIN — KETOROLAC TROMETHAMINE 15 MG: 15 INJECTION, SOLUTION INTRAMUSCULAR; INTRAVENOUS at 21:05

## 2022-09-21 RX ADMIN — AMPICILLIN SODIUM AND SULBACTAM SODIUM 3 G: 2; 1 INJECTION, POWDER, FOR SOLUTION INTRAMUSCULAR; INTRAVENOUS at 14:56

## 2022-09-21 RX ADMIN — AMPICILLIN SODIUM AND SULBACTAM SODIUM 3 G: 2; 1 INJECTION, POWDER, FOR SOLUTION INTRAMUSCULAR; INTRAVENOUS at 07:59

## 2022-09-21 RX ADMIN — SENNOSIDES AND DOCUSATE SODIUM 1 TABLET: 50; 8.6 TABLET ORAL at 08:07

## 2022-09-21 RX ADMIN — POLYETHYLENE GLYCOL 3350 17 G: 17 POWDER, FOR SOLUTION ORAL at 08:05

## 2022-09-21 RX ADMIN — AMPICILLIN SODIUM AND SULBACTAM SODIUM 3 G: 2; 1 INJECTION, POWDER, FOR SOLUTION INTRAMUSCULAR; INTRAVENOUS at 19:43

## 2022-09-21 RX ADMIN — LISINOPRIL 10 MG: 10 TABLET ORAL at 08:09

## 2022-09-21 RX ADMIN — LABETALOL HYDROCHLORIDE 10 MG: 5 INJECTION, SOLUTION INTRAVENOUS at 16:29

## 2022-09-21 RX ADMIN — SENNOSIDES AND DOCUSATE SODIUM 1 TABLET: 50; 8.6 TABLET ORAL at 16:33

## 2022-09-21 RX ADMIN — AMLODIPINE BESYLATE 5 MG: 5 TABLET ORAL at 08:07

## 2022-09-21 ASSESSMENT — ACTIVITIES OF DAILY LIVING (ADL)
ADLS_ACUITY_SCORE: 18

## 2022-09-21 NOTE — PROGRESS NOTES
09/21/22 0800   Quick Adds   Type of Visit Initial Occupational Therapy Evaluation   Living Environment   People in Home other (see comments)  (Room mate)   General Information   Onset of Illness/Injury or Date of Surgery 09/20/22   Referring Physician Dr. Vance Stephens   Patient/Family Therapy Goal Statement (OT) Return home with improved hand function   Additional Occupational Profile Info/Pertinent History of Current Problem Nicholas Martinez is a 57 year old male who presented to the emergency department with left hand swelling.  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  On Friday, he noted that his left middle finger was swollen.  He soaked the hand and was able to pull out several fiberglass splinters.  On Saturday, he noted that the dorsum and palmar surface of his hand were swollen and red.  Today, he has developed sweats and chills, so he presented to the emergency department for further evaluation.  The patient denies nasal congestion, runny nose, sore throat, wheezing, cough, or shortness of breath.  Patient denies chest pain, chest pressure, palpitations, lightheadedness, or dizziness.  Patient denies syncope or falls.  Patient denies nausea, vomiting, diarrhea, melena, hematochezia, constipation, or abdominal pain.  Patient denies dysuria or hematuria.  Patient denies rash, muscle aches, joint pain, or edema.  Patient denies headache, neck pain, or back pain.  Patient denies diplopia, dysarthria, dysphagia, incoordination, focal numbness, or unilateral weakness.  Patient denies tea colored urine.  He has no family history of premature coronary artery disease, strokes, renal failure.  He denies illicit drug use.   Existing Precautions/Restrictions no known precautions/restrictions   Left Upper Extremity (Weight-bearing Status) non weight-bearing (NWB)   Right Upper Extremity (Weight-bearing Status) full weight-bearing (FWB)   Left Lower Extremity (Weight-bearing  Status) full weight-bearing (FWB)   Right Lower Extremity (Weight-bearing Status) full weight-bearing (FWB)   Cognitive Status Examination   Orientation Status orientation to person, place and time   Visual Perception   Visual Impairment/Limitations corrective lenses full-time   Sensory   Sensory Comments reports unbandaged part of hand with normal sensation   Pain Assessment   Patient Currently in Pain No   Integumentary/Edema   Integumentary/Edema Comments Left hand mild edema   Range of Motion Comprehensive   General Range of Motion hand range of motion deficits identified   Comment, General Range of Motion decreased left hand flexion at MCP, PIP and DIP joints secondary to swelling and bandaging   Strength Comprehensive (MMT)   General Manual Muscle Testing (MMT) Assessment hand strength deficits identified   Hand Strength   Hand Strength Comments left hand weakness due to surgical procedure   Coordination   Upper Extremity Coordination Left UE impaired   Bed Mobility   Bed Mobility No deficits identified   Transfers   Transfers No deficits identified   Clinical Impression   Criteria for Skilled Therapeutic Interventions Met (OT) Yes, treatment indicated   OT Diagnosis Left UE impairment   Influenced by the following impairments decreased left hand ROM and strength, surgical precautions   OT Problem List-Impairments impacting ADL problems related to;range of motion (ROM);strength;pain;post-surgical precautions   Assessment of Occupational Performance 1-3 Performance Deficits   Identified Performance Deficits Activity that requires bilateral hand use   Planned Therapy Interventions (OT) progressive activity/exercise   Clinical Decision Making Complexity (OT) low complexity   Clinical Impression Comments Nicholas is a pleasant 57 year old male who is post left long finger irrigation and debridement due to infection.  He is currently bandaged limiting long finger movement with noted movement in other digits.  He is  appropriate for OT intervention for education of use with left UE   OT Discharge Planning   OT Discharge Recommendation (DC Rec) home;home with assist   OT Rationale for DC Rec Pt is independent with mobility/transfers/bed mobility.  He has limited use of left UE other than for stabilizing at this time.  He is cognitively aware of exercises and recommendations   Total Evaluation Time (Minutes)   Total Evaluation Time (Minutes) 10   OT Goals   Therapy Frequency (OT) One time eval and treatment   OT Predicted Duration/Target Date for Goal Attainment 09/21/22   OT Goals OT Goal 1   OT: Goal 1 Pt will verbalize an understanding of precautions and exercises to complete with left hand.

## 2022-09-21 NOTE — PLAN OF CARE
Patient is alert and orientated x4. Up IND in room. CMS intact to left hand and pulse present. Denies pain. Potassium checked per protocol and was WNL and order for recheck for the AM placed.     Blood pressure elevated and PRN Labetalol administered for BP of 202/106. Blood pressure now 190/112.

## 2022-09-21 NOTE — PROGRESS NOTES
"[facility] Progress Note      Post-operative Day: 1 Day Post-Op    Procedure(s):  Irrigation and  Debridement Left Long Finger  Subjective:    Pt reports ongoing tightness in the left MF, it is improved with loosening the bandage. He denies numbness or tingling.     Objective:  Blood pressure (!) 168/105, pulse 77, temperature 98.6  F (37  C), temperature source Oral, resp. rate 18, height 1.778 m (5' 10\"), weight 105.1 kg (231 lb 11.3 oz), SpO2 97 %.    Patient Vitals for the past 24 hrs:   BP Temp Temp src Pulse Resp SpO2 Height Weight   09/21/22 0807 (!) 168/105 98.6  F (37  C) Oral -- 18 97 % -- --   09/21/22 0515 (!) 180/114 97.6  F (36.4  C) Oral 77 16 96 % -- --   09/20/22 2330 (!) 177/101 98.3  F (36.8  C) Oral 97 16 96 % -- --   09/20/22 2030 (!) 163/89 -- -- 91 -- 90 % -- --   09/20/22 2000 (!) 164/96 -- -- 89 -- 90 % -- --   09/20/22 1900 (!) 179/103 98.2  F (36.8  C) Oral 87 16 94 % -- --   09/20/22 1847 -- -- -- -- -- 97 % -- --   09/20/22 1845 (!) 171/100 -- -- 93 -- 93 % -- --   09/20/22 1830 (!) 142/86 -- -- 85 15 97 % -- --   09/20/22 1823 128/78 98.1  F (36.7  C) Oral 80 14 97 % -- --   09/20/22 1715 (!) 152/88 -- -- 82 16 97 % -- --   09/20/22 1705 (!) 171/92 -- -- 86 16 98 % -- --   09/20/22 1700 (!) 155/90 -- -- 81 16 97 % -- --   09/20/22 1655 (!) 153/88 -- -- 82 8 96 % -- --   09/20/22 1650 (!) 153/91 -- -- 85 (!) 7 98 % -- --   09/20/22 1645 (!) 175/95 -- -- 85 8 95 % -- --   09/20/22 1630 (!) 166/97 -- -- -- -- -- -- --   09/20/22 1607 (!) 200/107 98.4  F (36.9  C) Oral 96 18 97 % 1.778 m (5' 10\") 105.1 kg (231 lb 11.3 oz)   09/20/22 1515 (!) 159/108 -- -- 83 -- 96 % -- --   09/20/22 1500 (!) 205/111 -- -- 89 18 -- -- --   09/20/22 1400 (!) 172/97 -- -- 86 -- -- -- --   09/20/22 1330 (!) 175/98 -- -- 80 -- -- -- --   09/20/22 1300 (!) 170/101 98.7  F (37.1  C) Oral 79 -- -- -- --   09/20/22 1230 (!) 187/111 -- -- 75 -- -- -- --   09/20/22 1200 (!) 206/119 -- -- -- -- -- -- -- "       Wt Readings from Last 4 Encounters:   09/20/22 105.1 kg (231 lb 11.3 oz)   02/11/19 109 kg (240 lb 3.2 oz)   01/02/19 104.4 kg (230 lb 3.2 oz)   12/17/18 104.3 kg (230 lb)       Gen: A&O x 3. NAD. Appears tired, awoken from sleep.  Wound status: Covered, post op dressings intact with mild dry drainage at the left palm.  Circulation, motion and sensation: Flexes and extends the digits within the confines of the dressings, decreased motion at the left MF; distal upper extremity sensation is intact and equal bilaterally. Fingers are warm and well perfused.    Swelling: Moderate    Pertinent Labs   Lab Results: personally reviewed.     Recent Labs   Lab Test 09/21/22  0513 09/19/22  2136 12/17/18  1425   HGB 15.2 16.2 17.9*   HCT  --  48.7 53.9*   MCV  --  85 87   PLT  --  245 233   NA  --  139 140       Plan:   Continue current cares and rehabilitation.  Dressing to stay intact today, remove tomorrow per . May leave open to air if no drainage, otherwise apply dry gauze dressings and change as needed.   Pain medications: oxycodone and toradol, Tylenol  Weight bearing status:  NWB YARAE  Cultures from surgery pending. Ongoing abx management per hospitalist, currently on IV Unasyn. Likely plan for oral abx on discharge.   Disposition:  Discharge per medicine, once plan in place for abx etc.              Report completed by:  Bryan Donohue PA-C  Date: 9/21/2022  Time: 11:31 AM

## 2022-09-21 NOTE — PROGRESS NOTES
Family education completed:Yes    Report given to: TANVIR Senior    Time of transfer:    Transferred to: 2213    [facility] sent:Yes    Family updated:Yes by patient     Reviewed pertinent information from EPIC (EMAR/Clinical Summary/Flowsheets):Yes    Head-to-toe assessment with receiving RN:No    Recommendations (e.g. Family needs/recent issues/things to watch for): post op care

## 2022-09-21 NOTE — OP NOTE
Procedure Date: 09/20/2022    PREOPERATIVE DIAGNOSIS:  Infected flexor tenosynovitis, left long finger.    POSTOPERATIVE DIAGNOSIS:  Infected flexor tenosynovitis, left long finger.    PROCEDURE:  Incision and drainage, left long finger flexor tendons.    SURGEON:  Vance Stephens MD    ANESTHESIA:  Supraclavicular block with local.    ESTIMATED BLOOD LOSS:  5 mL    CULTURES:  Left long finger fluid for aerobic and anaerobic culture.    COMPLICATIONS:  None apparent.    DISPOSITION:  Stable to PACU.    INDICATIONS FOR PROCEDURE:  Nicholas Martinez is a 57-year-old right-hand dominant male who over the last few weeks, he noticed some increased swelling and pain over his entire left long finger last weekend.  He soaked his finger, although things got worse.  Therefore, he came to the Emergency Department yesterday evening.  He had 4 of 4 Kanavel's signs and had an MRI this morning, which showed fluid around the tendon sheath.  There was no additional abscess on the MRI. Therefore, he elected to proceed with a left long finger irrigation and debridement in order to eradicate infection and optimize long-term function.  He understood the risks of ongoing infection, finger stiffness flexor tendon damage, need for further surgery, as well as risks inherent to anesthesia.    DESCRIPTION OF PROCEDURE:  Nicholas was met in the preoperative holding area where the left long finger was marked.  Consent was reviewed.  He was then transferred to the operating theater.  A supraclavicular block placed, which he tolerated well.  The left hand was then prepped and draped in standard sterile fashion.  He received Unasyn at this was on his regular dosing schedule.  A timeout was performed, verifying correct patient, surgery, and location. The left hand was then prepped and draped in standard sterile fashion.    We started by making 2 incisions in line with Pelon incision to the left long finger.  These were made over the volar aspect of the MCP  as well as the distal phalanx.  Dissection was sharply carried down through the skin and subcutaneous tissue.  We started proximally.  We released the A1 pulley.  There was gross purulence in the flexor tendon sheath.  We took swabs and sent for aerobic as well as anaerobic cultures.  Once the A1 pulley was widely release, we were able to clearly see the flexor tendon sheath.  We then used tenotomy scissors to spread and create a space on the volar side of the flexor tendon sheath.  We then went distal to the distal phalanx.  We again made a diagonal Pelon incision.  Dissection was sharply carried down through skin and subcutaneous tissue.  We then incised the A5 pulley and extended exposing the flexor tendon sheath.  We thoroughly irrigated these proximally and distally.  We then spread with tenotomy scissors from distal to proximal in order to create a little space.  We then passed an Angiocath catheter.  We thoroughly irrigated the wound with about 3 liters of normal saline.  There was clearly fluid coming out the proximal incision when Angiocath was inserted more distally at the A5 pulley.  Once the hand had been thoroughly irrigated, we loosely closed the incisions with interrupted 3-0 nylon sutures.  A sterile compressive dressing was applied and possible from anesthesia and transferred to recovery room in stable condition.    POSTOPERATIVE PLAN:    1.  Return to the ICU for close monitoring of his blood pressures.  2.  Continue IV Unasyn.  He will likely need to be discharged home on oral antibiotics pending culture results.  3.  Follow up in 1 week for suture removal.  If he is having significant stiffness, he may need hand therapy.    Vance Stephens MD        D: 2022   T: 2022   MT: KALIA    Name:     ARCELIA JONAH GIORGIO  MRN:      [id number]        Account:        [id number]   :      1965           Procedure Date: 2022     Document: [id number]

## 2022-09-22 VITALS
OXYGEN SATURATION: 97 % | DIASTOLIC BLOOD PRESSURE: 93 MMHG | WEIGHT: 231.71 LBS | BODY MASS INDEX: 33.17 KG/M2 | SYSTOLIC BLOOD PRESSURE: 197 MMHG | HEIGHT: 70 IN | HEART RATE: 74 BPM | RESPIRATION RATE: 18 BRPM | TEMPERATURE: 98 F

## 2022-09-22 LAB
GLUCOSE SERPL-MCNC: 93 MG/DL (ref 70–99)
HOLD SPECIMEN: NORMAL
POTASSIUM SERPL-SCNC: 3.5 MMOL/L (ref 3.4–5.3)

## 2022-09-22 PROCEDURE — 99238 HOSP IP/OBS DSCHRG MGMT 30/<: CPT | Performed by: INTERNAL MEDICINE

## 2022-09-22 PROCEDURE — 250N000013 HC RX MED GY IP 250 OP 250 PS 637: Performed by: INTERNAL MEDICINE

## 2022-09-22 PROCEDURE — 82947 ASSAY GLUCOSE BLOOD QUANT: CPT | Performed by: INTERNAL MEDICINE

## 2022-09-22 PROCEDURE — 250N000013 HC RX MED GY IP 250 OP 250 PS 637: Performed by: ORTHOPAEDIC SURGERY

## 2022-09-22 PROCEDURE — 250N000011 HC RX IP 250 OP 636: Performed by: ORTHOPAEDIC SURGERY

## 2022-09-22 PROCEDURE — 36415 COLL VENOUS BLD VENIPUNCTURE: CPT | Performed by: INTERNAL MEDICINE

## 2022-09-22 PROCEDURE — 84132 ASSAY OF SERUM POTASSIUM: CPT | Performed by: INTERNAL MEDICINE

## 2022-09-22 RX ORDER — TRIAMTERENE AND HYDROCHLOROTHIAZIDE 75; 50 MG/1; MG/1
1 TABLET ORAL DAILY
Qty: 30 TABLET | Refills: 0 | Status: SHIPPED | OUTPATIENT
Start: 2022-09-22 | End: 2022-10-12

## 2022-09-22 RX ORDER — AMOXICILLIN 875 MG
875 TABLET ORAL 3 TIMES DAILY
Qty: 36 TABLET | Refills: 0 | Status: SHIPPED | OUTPATIENT
Start: 2022-09-22 | End: 2022-10-04

## 2022-09-22 RX ORDER — LISINOPRIL 20 MG/1
20 TABLET ORAL DAILY
Qty: 30 TABLET | Refills: 0 | Status: SHIPPED | OUTPATIENT
Start: 2022-09-23 | End: 2022-10-06

## 2022-09-22 RX ADMIN — SENNOSIDES AND DOCUSATE SODIUM 1 TABLET: 50; 8.6 TABLET ORAL at 07:44

## 2022-09-22 RX ADMIN — ACETAMINOPHEN 650 MG: 325 TABLET, FILM COATED ORAL at 07:51

## 2022-09-22 RX ADMIN — POLYETHYLENE GLYCOL 3350 17 G: 17 POWDER, FOR SOLUTION ORAL at 07:49

## 2022-09-22 RX ADMIN — TRIAMTERENE AND HYDROCHLOROTHIAZIDE 2 TABLET: 37.5; 25 TABLET ORAL at 07:44

## 2022-09-22 RX ADMIN — AMPICILLIN SODIUM AND SULBACTAM SODIUM 3 G: 2; 1 INJECTION, POWDER, FOR SOLUTION INTRAMUSCULAR; INTRAVENOUS at 07:44

## 2022-09-22 RX ADMIN — AMPICILLIN SODIUM AND SULBACTAM SODIUM 3 G: 2; 1 INJECTION, POWDER, FOR SOLUTION INTRAMUSCULAR; INTRAVENOUS at 02:19

## 2022-09-22 RX ADMIN — LABETALOL HYDROCHLORIDE 10 MG: 5 INJECTION, SOLUTION INTRAVENOUS at 02:24

## 2022-09-22 RX ADMIN — LISINOPRIL 20 MG: 20 TABLET ORAL at 07:44

## 2022-09-22 ASSESSMENT — ACTIVITIES OF DAILY LIVING (ADL)
ADLS_ACUITY_SCORE: 18

## 2022-09-22 NOTE — PROGRESS NOTES
Alert and oriented X4. IND in room. PRN labetelol given for /102, recheck 169/102. CMS intact LUE, radial pulse 2+. Declines pain.

## 2022-09-22 NOTE — PLAN OF CARE
[facility] DISCHARGE NOTE    Patient discharged to home at 1:45 PM via wheel chair. Accompanied by staff. Discharge instructions reviewed with patient, opportunity offered to ask questions. Prescriptions sent to patients preferred pharmacy. All belongings sent with patient.    Amee Weaver RN

## 2022-09-22 NOTE — PROGRESS NOTES
[facility] Medicine Progress Note  Date of Service: 2022     Assessment & Plan          Nicholas Martinez is a 57 year old male admitted on 2022. He is admitted with tenosynovitis of his left hand and hypertensive urgency.     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 Left hand x-ray series shows no fractures or foreign bodies but moderate circumferential soft tissue swelling involving the proximal portions of the second through fourth digits. Afebrile, no leukocytosis at time of admission. Case was discussed between the emergency department and on-call ortho, who advised admission and starting vancomycin along with MRI imaging.   Nasal MRSA PCR negative.     Orthopedic surgery consulted and following, MRI showed tenosynovitis of the flexor tendons of the third digit from the level of the mid third metacarpal shaft through the level of the metacarpal head    Vancomycin initiated late evening , Unasyn added early morning , continue; Stopped vancomycin after negative MRSA screen    S/p I&D . Cultures positive Strep dysgalactiae.    - continue Unasyn IV today   - ortho service to check tomorrow  - Pain control with acetaminophen, oxycodone, and IV hydromorphone    Hypertensive urgency    Patient with prior diagnosis of hypertension, had been prescribed antihypertensives in the past (amlodipine 2.5 mg daily, metoprolol  mg daily, and triamterene 75-50 mg daily) but discontinued taking them a few years ago once prescription . Blood pressure on arrival to the emergency department was 256/142 mmHg.  The patient denies headaches, chest pain, or shortness of breath. ECG demonstrates no evidence of myocardial ischemia. No evidence of end organ damage. The patient was given labetalol 100 mg and lisinopril 10 mg in the emergency department.  Utox was positive for  amphetamines; it does not appear he is prescribed any pharmacologic amphetamine treatments.     Initially admitted under ICU status for close neurologic hemodynamic monitoring, changed to med/surg 9/20/2022 as blood pressure responding to interventions    Amlodipine 5 mg and lisinopril 10 mg initiated morning 9/20/2022. Patient reports he was on amlodipine previously and did not work for him, though may have been on low dose. -202/ today.   - continue lisinopril and increase to 20 mg tomorrow   - stop amlodipine   - resume previous Maxide 75-50 tomorrow  - IV labetalol and IV hydralazine to maintain blood pressures less than 200/110 mmHg  - Consider sleep study  - Follow up with PCP      Hypokalemia  Admit potassium was 3.3.    - Replaced per protocol. 3.8.    Obesity  Admit BMI 33.25. Contributes to morbidity and mortality     Diet: Advance Diet as Tolerated: Regular Diet Adult    DVT Prophylaxis: Low Risk/Ambulatory with no VTE prophylaxis indicated  Dial Catheter: Not present  Central Lines: None  Cardiac Monitoring: None  Code Status: Full Code        Discussion: Finger improving after surgery.  Ortho to unwrap tomorrow and review.  Continue IV antibiotics until okayed by orthopedic surgery.    Disposition Plan   Hopefully home tomorrow if doing well and will go on oral antibiotics    Attestation:  Total time: 20 minutes    Grupo Ornelas MD  [facility]        Interval History   Finger feels better though still having pain.  Can bend the little bit now.  No fevers or chills.  No chest pain or shortness of breath.  No headache.    Physical Exam   Temp:  [97.6  F (36.4  C)-98.6  F (37  C)] 98.1  F (36.7  C)  Pulse:  [77-98] 98  Resp:  [16-18] 18  BP: (163-202)/() 178/105  SpO2:  [90 %-98 %] 98 %    Weights:   Vitals:    09/19/22 1947 09/19/22 2358 09/20/22 1607   Weight: 108.9 kg (240 lb) 105.1 kg (231 lb 11.3 oz) 105.1 kg (231 lb 11.3 oz)    Body mass index is 33.25  kg/m .    Constitutional: Alert and oriented, no acute distress  CV: Regular, trace edema  Respiratory: CTA bilaterally  GI: Soft, non-tender, bowel sounds normal  Skin: Warm and dry  Musculoskeletal: Third digit still swollen though wrapped in gauze so cannot see the wound.  Hand is not tender to palpation    Data   Recent Labs   Lab 09/21/22  1539 09/21/22  0513 09/20/22  1225 09/20/22  0804 09/20/22  0008 09/19/22  2136   WBC  --   --   --   --   --  8.6   HGB  --  15.2  --   --   --  16.2   MCV  --   --   --   --   --  85   PLT  --   --   --   --   --  245   NA  --   --   --   --   --  139   POTASSIUM 3.8  --   --   --   --  3.3*   CHLORIDE  --   --   --   --   --  99   CO2  --   --   --   --   --  27   BUN  --   --   --   --   --  15.4   CR  --   --   --   --   --  0.93   ANIONGAP  --   --   --   --   --  13   COLE  --   --   --   --   --  8.9   GLC  --   --  115* 86 101* 95       Recent Labs   Lab 09/20/22  1225 09/20/22  0804 09/20/22  0008 09/19/22  2136   * 86 101* 95        Unresulted Labs Ordered in the Past 30 Days of this Admission     Date and Time Order Name Status Description    9/20/2022  6:00 PM Abscess Aerobic Bacterial Culture Routine Preliminary     9/20/2022  6:00 PM Anaerobic Bacterial Culture Routine In process            Imaging: No results found for this or any previous visit (from the past 24 hour(s)).     I reviewed all new labs and imaging results over the last 24 hours. I personally reviewed no images or EKG's today.    Medications       ampicillin-sulbactam  3 g Intravenous Q6H     ketorolac  15 mg Intravenous Q6H     [START ON 9/22/2022] lisinopril  20 mg Oral Daily     polyethylene glycol  17 g Oral Daily     senna-docusate  1 tablet Oral BID     sodium chloride (PF)  3 mL Intracatheter Q8H     [START ON 9/22/2022] triamterene-HCTZ  2 tablet Oral Daily   Reviewed medications    Grupo Ornelas MD  [facility]

## 2022-09-22 NOTE — PROGRESS NOTES
"[facility] Orthopaedics Progress Note      Post-operative Day: 2 Days Post-Op    Procedure(s):  Irrigation and  Debridement Left Long Finger  Subjective:    Pt reports that the finger and palm feel less tight and painful today. He tolerated the dressing change well but they noted slight drainage at the palmar incision so a light dressing was reapplied. He feels well overall.     Neuro:  Patient denies new onset numbness or paresthesias      Objective:  Blood pressure (!) 175/108, pulse 76, temperature 98.3  F (36.8  C), temperature source Oral, resp. rate 18, height 1.778 m (5' 10\"), weight 105.1 kg (231 lb 11.3 oz), SpO2 97 %.    Patient Vitals for the past 24 hrs:   BP Temp Temp src Pulse Resp SpO2   09/22/22 0720 (!) 175/108 98.3  F (36.8  C) Oral 76 18 97 %   09/22/22 0329 (!) 169/102 -- -- 72 -- --   09/22/22 0222 (!) 194/116 98.1  F (36.7  C) Oral 88 18 98 %   09/21/22 2342 (!) 173/103 98.1  F (36.7  C) Oral 87 18 97 %   09/21/22 1842 (!) 178/105 98.1  F (36.7  C) Oral 98 18 98 %   09/21/22 1736 (!) 190/112 -- -- 92 -- --   09/21/22 1718 (!) 200/112 -- -- -- -- --   09/21/22 1622 (!) 202/106 -- -- 91 -- --   09/21/22 1459 (!) 197/98 97.8  F (36.6  C) Oral 89 -- 96 %       Wt Readings from Last 4 Encounters:   09/20/22 105.1 kg (231 lb 11.3 oz)   02/11/19 109 kg (240 lb 3.2 oz)   01/02/19 104.4 kg (230 lb 3.2 oz)   12/17/18 104.3 kg (230 lb)       Gen: A&O x 3. NAD. Appears comfortable.   Wound status: Distal left MF incisions closed with suture and no drainage. Palmar incisions closed, no active drainage with sutures present and mild bloody drainage on dressings. Finger and hand swelling mildly improved with dorsal hand skin wrinkling. Ongoing discoloration in the MF improving.   Circulation, motion and sensation: Improved left wrist flexion/extension, able to minimally flex and extend the left MF, improved from previous; distal upper extremity sensation is intact and equal bilaterally. Fingers are warm and " well perfused.    Swelling: Moderate    Pertinent Labs   Lab Results: personally reviewed.     Recent Labs   Lab Test 09/21/22  0513 09/19/22  2136 12/17/18  1425   HGB 15.2 16.2 17.9*   HCT  --  48.7 53.9*   MCV  --  85 87   PLT  --  245 233   NA  --  139 140       Plan:   Continue current cares and rehabilitation. Discussed elevation, gentle motion of the left MF.   Pain medications: Tylenol prn  Weight bearing status:  NWB  Daily dressing change to left palm if drainage continues. Discussed that he should keep it dry until the drainage resolves.   Streptococcus dysgalactiae growing from surgical cultures, currently on IV Unasyn. Wound improving. Ok to transition to oral abx per hospitalist.  Disposition:  Orthopedically stable.              Report completed by:  Bryan Donohue PA-C  Date: 9/22/2022  Time: 10:05 AM

## 2022-09-22 NOTE — DISCHARGE SUMMARY
[facility]  Discharge Summary  Hospital Medicine       Date of Admission:  9/19/2022  Date of Discharge:  9/22/2022  Discharging Provider: Grupo Ornelas MD      Identification and Chief Compaint: Nicholas Martinez is a 57 year old male who presented on 9/19/2022 with complaint of 4 days increasing pain and swelling left hand.    Discharge Diagnoses   Left hand tenosynovitis, s/p I&D   Hypertensive urgency  Hypokalemia     Follow-ups Needed After Discharge   Follow-up Appointments     Follow-up and recommended labs and tests       Follow up with Dr.Erik Stephens's team for your left hand in 1 week. Call   his team at [phone] with questions. Call [facility]   scheduling at [phone] to make an appointment.         Follow-up and recommended labs and tests       Follow up with primary care provider, Dejuan Cintron, within 7-14   days for hospital follow- up of high blood pressure.  The following   labs/tests are recommended: BMP when you see Dr. Cintron.  Follow up with   orthopedic surgery per their recommendations.             Unresulted Labs Ordered in the Past 30 Days of this Admission     Date and Time Order Name Status Description    9/20/2022  6:00 PM Abscess Aerobic Bacterial Culture Routine Preliminary     9/20/2022  6:00 PM Anaerobic Bacterial Culture Routine Preliminary       These results will be followed up by Dr. Ornelas if significant.    Hospital Course          Left hand tenosynovitis, s/p I&D    Occurs in the setting of recent construction project handling fiberglass, but no obvious open injury. Left hand x-ray series shows no fractures or foreign bodies but moderate circumferential soft tissue swelling involving the proximal portions of the second through fourth digits. Afebrile, no leukocytosis at time of admission. Case was discussed between the emergency department and on-call ortho, who advised admission and starting vancomycin along with MRI  imaging.   Nasal MRSA PCR negative.     Orthopedic surgery consulted and following, MRI showed tenosynovitis of the flexor tendons of the third digit from the level of the mid third metacarpal shaft through the level of the metacarpal head    Vancomycin initiated late evening , Unasyn added early morning , continue; Stopped vancomycin after negative MRSA screen. Continued Unasyn. S/p I&D . Cultures positive Strep dysgalactiae. Home on amoxicillin 875 mg TID for 12 more days to complete 14 days total.     Hypertensive urgency  Hypertension     Patient with prior diagnosis of hypertension, had been prescribed antihypertensives in the past (amlodipine 2.5 mg daily, metoprolol  mg daily, and triamterene 75-50 mg daily) but discontinued taking them a few years ago once prescription . Blood pressure on arrival to the emergency department was 256/142 mmHg.  The patient denies headaches, chest pain, or shortness of breath. ECG demonstrates no evidence of myocardial ischemia. No evidence of end organ damage. The patient was given labetalol 100 mg and lisinopril 10 mg in the emergency department.  Utox was positive for amphetamines; it does not appear he is prescribed any pharmacologic amphetamine treatments.     Initially admitted under ICU status for close neurologic hemodynamic monitoring, changed to med/surg 2022 as blood pressure responding to interventions    Amlodipine 5 mg and lisinopril 10 mg initiated morning 2022. Patient reports he was on amlodipine previously and did not work for him, though may have been on low dose. -202/. Stopped amlodipine and increased lisinopril to 20 mg on day of discharge. Resumed previous Maxide 75-50 on day of discharge, as well.  BP this morning 164-206/, though this appeared to be improving in the afternoon 170's/. No symptoms. I expect BP control to continue to improve over next few days as lisinopril and Maxide reach  steady-state. The importance of follow up with PCP discussed with patient, and he expressed understanding.      Hypokalemia  Admit potassium was 3.3.    - Replaced per protocol. 3.8.    Obesity  Admit BMI 33.25. Contributes to morbidity and mortality      Consultations This Hospital Stay   PHARMACY TO DOSE VANCO  ORTHOPEDIC SURGERY IP CONSULT  PHARMACY TO DOSE VANCO  OCCUPATIONAL THERAPY ADULT IP CONSULT  PHYSICAL THERAPY ADULT IP CONSULT    Code Status   Full Code    The discharge plan was discussed with the patient, and he expressed understanding.     Time Spent on this Encounter   Total time on this discharge was < 30 minutes.       Grupo Ornelas MD  [facility]  ______________________________________________________________________    Physical Exam   Vital Signs: Temp: 98  F (36.7  C) Temp src: Oral BP: (!) 197/93 Pulse: 74   Resp: 18 SpO2: 97 % O2 Device: None (Room air)    Weight: 231 lbs 11.3 oz  Constitutional: alert and oriented, NAD, nontoxic  Skin/Musculoskeletal: left hand wrapped, swelling appears improved       Primary Care Physician   Dejuan Cintron  [address] [facility] [address]     Discharge Disposition   Discharged to home  Condition at discharge: Stable    Significant Results and Procedures   Results for orders placed or performed during the hospital encounter of 09/19/22   XR Hand Left G/E 3 Views    Narrative    EXAM: XR HAND LEFT G/E 3 VIEWS  LOCATION: [facility]  DATE/TIME: 9/19/2022 8:28 PM    INDICATION: concern for foreign body in left middle finger with swelling in the hand  COMPARISON: None.      Impression    IMPRESSION: No fracture or foreign body. Moderate circumferential soft tissue swelling involving the proximal portions of the second through fourth digits.   MR Hand Left w/o & w Contrast    Narrative    MR left hand  without and with IV contrast 9/20/2022 12:03 PM    Techniques: Multiplanar  multisequence imaging of the left hand was  obtained without and with administration of intravenous contrast using  routine protocol. Contrast: 10 mL Gadavist     History: Left hand infection    Additional History from EMR: Left hand swelling, recent fiberglass  splinters.    Comparison: Radiographs 9/19/2022    Findings: Images degraded by motion and poor fat saturation,  especially in the phalanges..    Osseous structures  Osseous structures: No fracture, stress reaction, avascular necrosis,  or focal osseous lesion is seen.    Joint and periarticular soft tissue    Collateral ligaments of the second through fifth metacarpophalangeal  joints. Intact.      Muscles and tendons  Muscles: Visualized muscles are unremarkable without evidence of  muscle strain, atrophy or mass.     Tendons: Tenosynovial fluid surrounding the flexor tendons of the  third digit extending from the level of the mid shaft of the third  metacarpal to the level of the third metacarpal head.  Extensor tendons appear normal.    Other Findings:  Enhancing dorsal and volar subcutaneous edema.      Impression    Impression:    1. Tenosynovitis of the flexor tendons of the third digit from the  level of the mid third metacarpal shaft through the level of the  metacarpal head. This is concerning for infectious tenosynovitis.    2. Enhancing dorsal and volar subcutaneous fat compatible with  cellulitis. No abscess.    ZACARIAS RODRIGUEZ MD (Joe)         SYSTEM ID:  [id number]   POC US GUIDANCE NEEDLE PLACEMENT    Impression    nml     Procedures    I&D left hand tenosynovitis    Discharge Orders      Follow-up and recommended labs and tests     Follow up with Dr.Erik Stephens's team for your left hand in 1 week. Call his team at [phone] with questions. Call [facility] scheduling at [phone] to make an appointment.     Activity    Your activity upon discharge:   1. Elevate your left hand as much as possible.   2. Use your left  shoulder and elbow as tolerated.  3. Avoid weight bearing through your left hand, use of the left hand.     Wound care and dressings    Instructions to care for your wound at home:   If your left hand incisions are not draining it is ok to keep them uncovered. You may get them wet with showering.     If there is ongoing drainage, cover them with dry gauze dressings and change as needed, keep them dry.     Reason for your hospital stay    Tenosynovitis (infection in tendon sheath)     Follow-up and recommended labs and tests     Follow up with primary care provider, Dejuan Cintron, within 7-14 days for hospital follow- up of high blood pressure.  The following labs/tests are recommended: BMP when you see Dr. Cintron.  Follow up with orthopedic surgery per their recommendations.     Activity    Your activity upon discharge: see orthopedic recommendations for the left hand     Diet    Follow this diet upon discharge: Regular Diet Adult     Discharge Medications   Current Discharge Medication List      START taking these medications    Details   amoxicillin (AMOXIL) 875 MG tablet Take 1 tablet (875 mg) by mouth 3 times daily for 12 days  Qty: 36 tablet, Refills: 0    Associated Diagnoses: Tenosynovitis, suppurative      lisinopril (ZESTRIL) 20 MG tablet Take 1 tablet (20 mg) by mouth daily  Qty: 30 tablet, Refills: 0    Associated Diagnoses: Essential hypertension with goal blood pressure less than 130/80      triamterene-HCTZ (MAXZIDE) 75-50 MG tablet Take 1 tablet by mouth daily  Qty: 30 tablet, Refills: 0    Associated Diagnoses: Essential hypertension with goal blood pressure less than 130/80         CONTINUE these medications which have NOT CHANGED    Details   Multiple Vitamins-Minerals (MULTIVITAMIN GUMMIES MENS) CHEW Take 1 chew tab by mouth daily           Allergies   No Known Allergies

## 2022-09-23 ENCOUNTER — PATIENT OUTREACH (OUTPATIENT)
Dept: FAMILY MEDICINE | Facility: CLINIC | Age: 57
End: 2022-09-23

## 2022-09-23 NOTE — TELEPHONE ENCOUNTER
ED / Discharge Outreach Protocol    Patient Contact    Attempt # 1    Was call answered?  No.  Unable to leave message.    Bushra Meza RN

## 2022-09-25 LAB — BACTERIA ABSC ANAEROBE+AEROBE CULT: ABNORMAL

## 2022-09-26 NOTE — TELEPHONE ENCOUNTER
ED / Discharge Outreach Protocol    Patient Contact    Attempt # 2    Was call answered?  No.  Unable to leave message. Yolis GARG RN

## 2022-09-27 LAB — BACTERIA ABSC ANAEROBE+AEROBE CULT: NORMAL

## 2022-09-28 ENCOUNTER — TRANSFERRED RECORDS (OUTPATIENT)
Dept: HEALTH INFORMATION MANAGEMENT | Facility: CLINIC | Age: 57
End: 2022-09-28

## 2022-10-06 ENCOUNTER — OFFICE VISIT (OUTPATIENT)
Dept: FAMILY MEDICINE | Facility: CLINIC | Age: 57
End: 2022-10-06
Payer: COMMERCIAL

## 2022-10-06 VITALS
DIASTOLIC BLOOD PRESSURE: 100 MMHG | TEMPERATURE: 97.1 F | HEART RATE: 112 BPM | WEIGHT: 231.8 LBS | RESPIRATION RATE: 20 BRPM | SYSTOLIC BLOOD PRESSURE: 204 MMHG | OXYGEN SATURATION: 98 % | HEIGHT: 70 IN | BODY MASS INDEX: 33.18 KG/M2

## 2022-10-06 DIAGNOSIS — Z23 NEED FOR PROPHYLACTIC VACCINATION AND INOCULATION AGAINST INFLUENZA: ICD-10-CM

## 2022-10-06 DIAGNOSIS — Z11.4 SCREENING FOR HIV (HUMAN IMMUNODEFICIENCY VIRUS): ICD-10-CM

## 2022-10-06 DIAGNOSIS — Z12.11 SCREEN FOR COLON CANCER: ICD-10-CM

## 2022-10-06 DIAGNOSIS — Z13.220 SCREENING FOR HYPERLIPIDEMIA: ICD-10-CM

## 2022-10-06 DIAGNOSIS — Z11.59 NEED FOR HEPATITIS C SCREENING TEST: ICD-10-CM

## 2022-10-06 DIAGNOSIS — R79.89 ELEVATED SERUM CREATININE: ICD-10-CM

## 2022-10-06 DIAGNOSIS — I10 UNCONTROLLED HYPERTENSION: ICD-10-CM

## 2022-10-06 DIAGNOSIS — M65.88 TENOSYNOVITIS, SUPPURATIVE: Primary | ICD-10-CM

## 2022-10-06 LAB
ANION GAP SERPL CALCULATED.3IONS-SCNC: 11 MMOL/L (ref 7–15)
BUN SERPL-MCNC: 17.6 MG/DL (ref 6–20)
CALCIUM SERPL-MCNC: 9.3 MG/DL (ref 8.6–10)
CHLORIDE SERPL-SCNC: 101 MMOL/L (ref 98–107)
CHOLEST SERPL-MCNC: 158 MG/DL
CREAT SERPL-MCNC: 1.19 MG/DL (ref 0.67–1.17)
DEPRECATED HCO3 PLAS-SCNC: 30 MMOL/L (ref 22–29)
GFR SERPL CREATININE-BSD FRML MDRD: 71 ML/MIN/1.73M2
GLUCOSE SERPL-MCNC: 99 MG/DL (ref 70–99)
HCV AB SERPL QL IA: NONREACTIVE
HDLC SERPL-MCNC: 37 MG/DL
HIV 1+2 AB+HIV1 P24 AG SERPL QL IA: NONREACTIVE
LDLC SERPL CALC-MCNC: 98 MG/DL
NONHDLC SERPL-MCNC: 121 MG/DL
POTASSIUM SERPL-SCNC: 4.4 MMOL/L (ref 3.4–5.3)
SODIUM SERPL-SCNC: 142 MMOL/L (ref 136–145)
TRIGL SERPL-MCNC: 113 MG/DL

## 2022-10-06 PROCEDURE — 87389 HIV-1 AG W/HIV-1&-2 AB AG IA: CPT | Performed by: FAMILY MEDICINE

## 2022-10-06 PROCEDURE — 80048 BASIC METABOLIC PNL TOTAL CA: CPT | Performed by: FAMILY MEDICINE

## 2022-10-06 PROCEDURE — 36415 COLL VENOUS BLD VENIPUNCTURE: CPT | Performed by: FAMILY MEDICINE

## 2022-10-06 PROCEDURE — 86803 HEPATITIS C AB TEST: CPT | Performed by: FAMILY MEDICINE

## 2022-10-06 PROCEDURE — 90682 RIV4 VACC RECOMBINANT DNA IM: CPT | Performed by: FAMILY MEDICINE

## 2022-10-06 PROCEDURE — 99495 TRANSJ CARE MGMT MOD F2F 14D: CPT | Performed by: FAMILY MEDICINE

## 2022-10-06 PROCEDURE — 90471 IMMUNIZATION ADMIN: CPT | Performed by: FAMILY MEDICINE

## 2022-10-06 PROCEDURE — 80061 LIPID PANEL: CPT | Performed by: FAMILY MEDICINE

## 2022-10-06 RX ORDER — AMOXICILLIN 875 MG
875 TABLET ORAL 3 TIMES DAILY
Qty: 21 TABLET | Refills: 0 | Status: SHIPPED | OUTPATIENT
Start: 2022-10-06 | End: 2022-10-13

## 2022-10-06 RX ORDER — LISINOPRIL 20 MG/1
30 TABLET ORAL DAILY
Start: 2022-10-06 | End: 2022-10-11

## 2022-10-06 ASSESSMENT — PAIN SCALES - GENERAL: PAINLEVEL: NO PAIN (1)

## 2022-10-06 NOTE — PATIENT INSTRUCTIONS
Extend amoxicilling of another 7 days.  Schedule follow up with orthopedics. You said you have their contact information.  Seek prompt medical care if with increasing hand swelling.    Increase lisinopril 20 mg to 1.5 tablets daily.  Low salt.    If you have persistent chest pain, persistent or worsening breathing difficulty, palpitation, lightheadedness or other concerning symptoms, you should be brought to the ER.

## 2022-10-06 NOTE — PROGRESS NOTES
Assessment & Plan     Tenosynovitis, suppurative  Improved but exam today still shows mild induration along proximal half of left middle finger and MCP.  Extend amoxicillin for another r7 days.  Patient will call ortho to schedule follow up.  Return precautions discussed and given to patient.  Reasons to go to ER discussed in detail with patient.  - amoxicillin (AMOXIL) 875 MG tablet  Dispense: 21 tablet; Refill: 0    Uncontrolled hypertension  Asymptomatic.  Reviewed his meds with patient.  Increase lisinopril to 30 mg daily.  Recheck with RN next week.   Continue triamterene-hydrochlorothiazide.  Reinforced sodium restriction.  Reasons to go to ER discussed in detail with patient.  - Basic metabolic panel  - lisinopril (ZESTRIL) 20 MG tablet  - Basic metabolic panel    Screen for colon cancer  Patient was advised of modalities.  He preferred to pursue colonoscopy.  - Colonoscopy Screening  Referral    Screening for HIV (human immunodeficiency virus)  Please send letter stating Hepatitis C screening is negative.   - HIV Antigen Antibody Combo  - HIV Antigen Antibody Combo    Need for hepatitis C screening test  Please send letter stating Hepatitis C screening is negative.   - Hepatitis C Screen Reflex to HCV RNA Quant and Genotype  - Hepatitis C Screen Reflex to HCV RNA Quant and Genotype    Screening for hyperlipidemia  - Lipid panel reflex to direct LDL Fasting  - Lipid panel reflex to direct LDL Fasting    Need for prophylactic vaccination and inoculation against influenza  - INFLUENZA QUAD, RECOMBINANT, P-FREE (RIV4) (FLUBLOK)      Patient Instructions   Extend amoxicilling of another 7 days.  Schedule follow up with orthopedics. You said you have their contact information.  Seek prompt medical care if with increasing hand swelling.    Increase lisinopril 20 mg to 1.5 tablets daily.  Low salt.    If you have persistent chest pain, persistent or worsening breathing difficulty, palpitation,  lightheadedness or other concerning symptoms, you should be brought to the ER.       Return in about 5 days (around 10/11/2022) for Blood pressure recheck with the nurse.    Dejuan Cintron MD  [facility] LIZZY Peterson is a 57 year old, presenting for the following health issues:  Hospital F/U (Pt being seen for post hospital follow up.) and Imm/Inj (Flu Shot)      HPI       Hospital Follow-up Visit:    Hospital/Nursing Home/IP Rehab Facility: [facility]  Date of Admission: 9/19/22  Date of Discharge: 9/22/22  Reason(s) for Admission: essential hypertension    Was your hospitalization related to COVID-19? No   Problems taking medications regularly:  None  Medication changes since discharge: lisinopril, HCTZ  Problems adhering to non-medication therapy:  None    Summary of hospitalization:  [facility] discharge summary reviewed    Discharge Orders             Follow-up and recommended labs and tests      Follow up with Dr.Erik Stephens's team for your left hand in 1 week. Call his team at [phone] with questions. Call [facility] scheduling at [phone] to make an appointment.          Activity     Your activity upon discharge:   1. Elevate your left hand as much as possible.   2. Use your left shoulder and elbow as tolerated.  3. Avoid weight bearing through your left hand, use of the left hand.          Wound care and dressings     Instructions to care for your wound at home:   If your left hand incisions are not draining it is ok to keep them uncovered. You may get them wet with showering.      If there is ongoing drainage, cover them with dry gauze dressings and change as needed, keep them dry.          Reason for your hospital stay     Tenosynovitis (infection in tendon sheath)          Follow-up and recommended labs and tests      Follow up with primary care provider, Dejuan Cintron, within 7-14 days for  hospital follow- up of high blood pressure.  The following labs/tests are recommended: BMP when you see Dr. Cintron.  Follow up with orthopedic surgery per their recommendations.          Activity     Your activity upon discharge: see orthopedic recommendations for the left hand          Diet     Follow this diet upon discharge: Regular Diet Adult        Discharge Medications          Current Discharge Medication List            START taking these medications     Details   amoxicillin (AMOXIL) 875 MG tablet Take 1 tablet (875 mg) by mouth 3 times daily for 12 days  Qty: 36 tablet, Refills: 0     Associated Diagnoses: Tenosynovitis, suppurative       lisinopril (ZESTRIL) 20 MG tablet Take 1 tablet (20 mg) by mouth daily  Qty: 30 tablet, Refills: 0     Associated Diagnoses: Essential hypertension with goal blood pressure less than 130/80       triamterene-HCTZ (MAXZIDE) 75-50 MG tablet Take 1 tablet by mouth daily  Qty: 30 tablet, Refills: 0     Associated Diagnoses: Essential hypertension with goal blood pressure less than 130/80               CONTINUE these medications which have NOT CHANGED     Details   Multiple Vitamins-Minerals (MULTIVITAMIN GUMMIES MENS) CHEW Take 1 chew tab by mouth daily               Diagnostic Tests/Treatments reviewed.  Follow up needed: see above  Other Healthcare Providers Involved in Patient s Care:         ortho follow up as needed (according to pt)  Update since discharge: improved.   Patient has completed antibiotics yesterday.  Patient asked if he still has infection in the left middle finger. He states he thought of this because it still looks swollen.  Denies fever or increase in finger redness.      Takes blood pressure meds with no issues.  Denies chest pain, dyspnea, HA, BOV, dizziness or urinary changes.      Post Medication Reconciliation Status: Discharge medications reconciled, continue medications without change      Plan of care communicated with patient         Review of  "Systems   Constitutional, HEENT, cardiovascular, pulmonary, GI, , musculoskeletal, neuro, skin, endocrine and psych systems are negative, except as otherwise noted.      Objective    BP (!) 204/110   Pulse 112   Temp 97.1  F (36.2  C) (Tympanic)   Resp 20   Ht 1.778 m (5' 10\")   Wt 105.1 kg (231 lb 12.8 oz)   SpO2 98%   BMI 33.26 kg/m    Body mass index is 33.26 kg/m .  Physical Exam   GENERAL: obese,  alert and no distress, ambulatory w/o assist  NECK: no tenderness, no adenopathy,  Thyroid not enlarged  RESP: lungs clear to auscultation - no rales, no rhonchi, no wheezes  CV: regular rates and rhythm, no murmur  MS: no edema  LEFT HAND: 3rd finger with mild induration proximal to PI and around MCP but no fluctuant area; sligthly limited movement of 3rd MCP and PIP due to swelling/tightness and mild pain; no other swelling; no open wound.  SKIN: no suspicious lesions, no rashes  NEURO: strength and tone- normal, sensory exam- grossly normal, mentation- intact, speech- normal, reflexes- symmetric  ABD:  nontender    Results for orders placed or performed in visit on 10/06/22   Lipid panel reflex to direct LDL Fasting     Status: Abnormal   Result Value Ref Range    Cholesterol 158 <200 mg/dL    Triglycerides 113 <150 mg/dL    Direct Measure HDL 37 (L) >=40 mg/dL    LDL Cholesterol Calculated 98 <=100 mg/dL    Non HDL Cholesterol 121 <130 mg/dL    Narrative    Cholesterol  Desirable:  <200 mg/dL    Triglycerides  Normal:  Less than 150 mg/dL  Borderline High:  150-199 mg/dL  High:  200-499 mg/dL  Very High:  Greater than or equal to 500 mg/dL    Direct Measure HDL  Female:  Greater than or equal to 50 mg/dL   Male:  Greater than or equal to 40 mg/dL    LDL Cholesterol  Desirable:  <100mg/dL  Above Desirable:  100-129 mg/dL   Borderline High:  130-159 mg/dL   High:  160-189 mg/dL   Very High:  >= 190 mg/dL    Non HDL Cholesterol  Desirable:  130 mg/dL  Above Desirable:  130-159 mg/dL  Borderline High:  " 160-189 mg/dL  High:  190-219 mg/dL  Very High:  Greater than or equal to 220 mg/dL   Basic metabolic panel     Status: Abnormal   Result Value Ref Range    Sodium 142 136 - 145 mmol/L    Potassium 4.4 3.4 - 5.3 mmol/L    Chloride 101 98 - 107 mmol/L    Carbon Dioxide (CO2) 30 (H) 22 - 29 mmol/L    Anion Gap 11 7 - 15 mmol/L    Urea Nitrogen 17.6 6.0 - 20.0 mg/dL    Creatinine 1.19 (H) 0.67 - 1.17 mg/dL    Calcium 9.3 8.6 - 10.0 mg/dL    Glucose 99 70 - 99 mg/dL    GFR Estimate 71 >60 mL/min/1.73m2

## 2022-10-11 ENCOUNTER — TELEPHONE (OUTPATIENT)
Dept: FAMILY MEDICINE | Facility: CLINIC | Age: 57
End: 2022-10-11

## 2022-10-11 ENCOUNTER — ALLIED HEALTH/NURSE VISIT (OUTPATIENT)
Dept: FAMILY MEDICINE | Facility: CLINIC | Age: 57
End: 2022-10-11
Payer: COMMERCIAL

## 2022-10-11 VITALS — OXYGEN SATURATION: 98 % | SYSTOLIC BLOOD PRESSURE: 146 MMHG | HEART RATE: 96 BPM | DIASTOLIC BLOOD PRESSURE: 90 MMHG

## 2022-10-11 DIAGNOSIS — I10 UNCONTROLLED HYPERTENSION: ICD-10-CM

## 2022-10-11 DIAGNOSIS — I10 UNCONTROLLED HYPERTENSION: Primary | ICD-10-CM

## 2022-10-11 PROCEDURE — 99207 PR NO CHARGE NURSE ONLY: CPT

## 2022-10-11 RX ORDER — LISINOPRIL 40 MG/1
40 TABLET ORAL DAILY
Qty: 90 TABLET | Refills: 3 | Status: SHIPPED | OUTPATIENT
Start: 2022-10-11 | End: 2023-10-30

## 2022-10-11 NOTE — PROGRESS NOTES
Nicholas Martinez is a 57 year old year old patient who comes in today for a Blood Pressure check because of medication change.    Vital Signs as repeated by /94 p 97, 146/90     Patient is taking medication as prescribed    Patient is tolerating medications well.    Patient is not monitoring Blood Pressure at home.     Current complaints: none    Disposition:  patient to continue with the same medication and await provider response    Pt will need refills of bp meds when dosage/changes are decided      Rigo Mendez RN

## 2022-10-11 NOTE — TELEPHONE ENCOUNTER
Increase lisinopril to 40 mg daily. New Rx has been sent.  Schedule follow up with me in 2 weeks for blood pressure management. Patient may be given same day appointment slot.

## 2022-10-12 DIAGNOSIS — I10 ESSENTIAL HYPERTENSION WITH GOAL BLOOD PRESSURE LESS THAN 130/80: ICD-10-CM

## 2022-10-12 RX ORDER — TRIAMTERENE AND HYDROCHLOROTHIAZIDE 75; 50 MG/1; MG/1
1 TABLET ORAL DAILY
Qty: 30 TABLET | Refills: 0 | Status: SHIPPED | OUTPATIENT
Start: 2022-10-12 | End: 2022-11-12

## 2022-10-12 NOTE — TELEPHONE ENCOUNTER
Medication is being filled for 1 time refill only due to:  Patient needs to be seen because for BP follow up in 2 weeks..   Has pending appt.    Tasha Sharma RN

## 2022-10-12 NOTE — TELEPHONE ENCOUNTER
Pending Prescriptions:                       Disp   Refills    triamterene-HCTZ (MAXZIDE) 75-50 MG [id number] tab*0            Sig: Take 1 tablet by mouth daily

## 2022-10-12 NOTE — TELEPHONE ENCOUNTER
Pt seen for RN BP check yesterday and provider advised:  Dejuan Cintron MD     AS     5:52 PM  Note  Increase lisinopril to 40 mg daily. New Rx has been sent.  Schedule follow up with me in 2 weeks for blood pressure management. Patient may be given same day appointment slot.

## 2022-10-12 NOTE — TELEPHONE ENCOUNTER
"Requested Prescriptions   Pending Prescriptions Disp Refills     triamterene-HCTZ (MAXZIDE) 75-50 MG tablet 30 tablet 0     Sig: Take 1 tablet by mouth daily       Diuretics (Including Combos) Protocol Failed - 10/12/2022  5:39 PM        Failed - Blood pressure under 140/90 in past 12 months     BP Readings from Last 3 Encounters:   10/11/22 (!) 146/90   10/06/22 (!) 204/100   09/22/22 (!) 197/93                 Failed - Normal serum creatinine on file in past 12 months     Recent Labs   Lab Test 10/06/22  1019   CR 1.19*              Passed - Recent (12 mo) or future (30 days) visit within the authorizing provider's specialty     Patient has had an office visit with the authorizing provider or a provider within the authorizing providers department within the previous 12 mos or has a future within next 30 days. See \"Patient Info\" tab in inbasket, or \"Choose Columns\" in Meds & Orders section of the refill encounter.              Passed - Medication is active on med list        Passed - Patient is age 18 or older        Passed - Normal serum potassium on file in past 12 months     Recent Labs   Lab Test 10/06/22  1019   POTASSIUM 4.4                    Passed - Normal serum sodium on file in past 12 months     Recent Labs   Lab Test 10/06/22  1019                      "

## 2022-10-18 ENCOUNTER — HOSPITAL ENCOUNTER (EMERGENCY)
Facility: CLINIC | Age: 57
Discharge: HOME | End: 2022-10-18
Attending: FAMILY MEDICINE | Admitting: FAMILY MEDICINE
Payer: OTHER MISCELLANEOUS

## 2022-10-18 VITALS
OXYGEN SATURATION: 99 % | RESPIRATION RATE: 8 BRPM | SYSTOLIC BLOOD PRESSURE: 172 MMHG | BODY MASS INDEX: 32.93 KG/M2 | HEIGHT: 70 IN | TEMPERATURE: 98.4 F | WEIGHT: 230 LBS | DIASTOLIC BLOOD PRESSURE: 94 MMHG | HEART RATE: 92 BPM

## 2022-10-18 DIAGNOSIS — I10 HYPERTENSION, UNSPECIFIED TYPE: ICD-10-CM

## 2022-10-18 DIAGNOSIS — M65.949 TENOSYNOVITIS OF HAND: ICD-10-CM

## 2022-10-18 LAB
ANION GAP SERPL CALCULATED.3IONS-SCNC: 11 MMOL/L (ref 7–15)
BASOPHILS # BLD AUTO: 0.1 10E3/UL (ref 0–0.2)
BASOPHILS NFR BLD AUTO: 1 %
BUN SERPL-MCNC: 23.6 MG/DL (ref 6–20)
CALCIUM SERPL-MCNC: 9.5 MG/DL (ref 8.6–10)
CHLORIDE SERPL-SCNC: 100 MMOL/L (ref 98–107)
CREAT SERPL-MCNC: 1.37 MG/DL (ref 0.67–1.17)
CRP SERPL-MCNC: 3.19 MG/L
DEPRECATED HCO3 PLAS-SCNC: 29 MMOL/L (ref 22–29)
EOSINOPHIL # BLD AUTO: 0.8 10E3/UL (ref 0–0.7)
EOSINOPHIL NFR BLD AUTO: 11 %
ERYTHROCYTE [DISTWIDTH] IN BLOOD BY AUTOMATED COUNT: 12.8 % (ref 10–15)
GFR SERPL CREATININE-BSD FRML MDRD: 60 ML/MIN/1.73M2
GLUCOSE SERPL-MCNC: 98 MG/DL (ref 70–99)
HCT VFR BLD AUTO: 47.1 % (ref 40–53)
HGB BLD-MCNC: 15.7 G/DL (ref 13.3–17.7)
IMM GRANULOCYTES # BLD: 0 10E3/UL
IMM GRANULOCYTES NFR BLD: 0 %
LYMPHOCYTES # BLD AUTO: 3.6 10E3/UL (ref 0.8–5.3)
LYMPHOCYTES NFR BLD AUTO: 47 %
MCH RBC QN AUTO: 28.3 PG (ref 26.5–33)
MCHC RBC AUTO-ENTMCNC: 33.3 G/DL (ref 31.5–36.5)
MCV RBC AUTO: 85 FL (ref 78–100)
MONOCYTES # BLD AUTO: 0.7 10E3/UL (ref 0–1.3)
MONOCYTES NFR BLD AUTO: 9 %
NEUTROPHILS # BLD AUTO: 2.4 10E3/UL (ref 1.6–8.3)
NEUTROPHILS NFR BLD AUTO: 32 %
NRBC # BLD AUTO: 0 10E3/UL
NRBC BLD AUTO-RTO: 0 /100
PLATELET # BLD AUTO: 256 10E3/UL (ref 150–450)
POTASSIUM SERPL-SCNC: 4.1 MMOL/L (ref 3.4–5.3)
RBC # BLD AUTO: 5.54 10E6/UL (ref 4.4–5.9)
SODIUM SERPL-SCNC: 140 MMOL/L (ref 136–145)
WBC # BLD AUTO: 7.6 10E3/UL (ref 4–11)

## 2022-10-18 PROCEDURE — 99284 EMERGENCY DEPT VISIT MOD MDM: CPT | Mod: 25 | Performed by: FAMILY MEDICINE

## 2022-10-18 PROCEDURE — 86140 C-REACTIVE PROTEIN: CPT | Performed by: FAMILY MEDICINE

## 2022-10-18 PROCEDURE — 85025 COMPLETE CBC W/AUTO DIFF WBC: CPT | Performed by: FAMILY MEDICINE

## 2022-10-18 PROCEDURE — 36415 COLL VENOUS BLD VENIPUNCTURE: CPT | Performed by: FAMILY MEDICINE

## 2022-10-18 PROCEDURE — 250N000011 HC RX IP 250 OP 636: Performed by: FAMILY MEDICINE

## 2022-10-18 PROCEDURE — 99284 EMERGENCY DEPT VISIT MOD MDM: CPT | Performed by: FAMILY MEDICINE

## 2022-10-18 PROCEDURE — 96365 THER/PROPH/DIAG IV INF INIT: CPT | Performed by: FAMILY MEDICINE

## 2022-10-18 PROCEDURE — 80048 BASIC METABOLIC PNL TOTAL CA: CPT | Performed by: FAMILY MEDICINE

## 2022-10-18 RX ORDER — CEPHALEXIN 500 MG/1
500 CAPSULE ORAL 3 TIMES DAILY
Qty: 21 CAPSULE | Refills: 0 | Status: SHIPPED | OUTPATIENT
Start: 2022-10-18 | End: 2022-10-25

## 2022-10-18 RX ORDER — CEFAZOLIN SODIUM 2 G/100ML
2 INJECTION, SOLUTION INTRAVENOUS ONCE
Status: COMPLETED | OUTPATIENT
Start: 2022-10-18 | End: 2022-10-18

## 2022-10-18 RX ADMIN — CEFAZOLIN SODIUM 2 G: 2 INJECTION, SOLUTION INTRAVENOUS at 10:21

## 2022-10-18 ASSESSMENT — ACTIVITIES OF DAILY LIVING (ADL): ADLS_ACUITY_SCORE: 35

## 2022-10-18 NOTE — DISCHARGE INSTRUCTIONS
Most important thing is that you return to physical therapy and aggressively rehab the scar tissue.  Begin taking cephalexin 500 mg 3 times daily  Return to be seen if you are having increasing pain, redness, warmth.  Follow-up as planned with Dr. Vargas

## 2022-10-18 NOTE — ED TRIAGE NOTES
Pt reports left middle finger swelling, middle finger cold with slow cap refill. Pt reports having surgery on this finger last month with an infection. Pt tachycardic and hypertensive in triage.     Triage Assessment     Row Name 10/18/22 0918       Triage Assessment (Adult)    Airway WDL WDL       Respiratory WDL    Respiratory WDL WDL       Cardiac WDL    Cardiac WDL X;rhythm    Pulse Rate & Regularity tachycardic       Cognitive/Neuro/Behavioral WDL    Cognitive/Neuro/Behavioral WDL WDL

## 2022-10-18 NOTE — ED PROVIDER NOTES
History     Chief Complaint   Patient presents with     Hand Pain     Left middle finger swelling     HPI     Nicholas Martinez is a 57 year old male who comes in with increased swelling and decreased motion of his left hand.  He was hospitalized September 19 with septic tenosynovitis of the left hand flexor sheath involving the middle index and ring fingers.  Cultures from this grew out strep dysgalactiae.  He was treated with 14 days of amoxicillin which was then extended for an additional week by his primary care physician.  He was found to be quite hypertensive although asymptomatic during that time in the ED.  He has been monitoring his blood pressure working with his primary care physician and escalating his antihypertensive medication regimen with an increase in his triamterene hydrochlorothiazide last week and an increase in his lisinopril.  He is not having significant pain.  He rates the pain at 1 out of 10.  He does not have any fever.  His main concern is that he feels like he has decreased motion now involving the ring and index finger on flexion.  He has not been able to get much physical therapy.  In fact he is only gone to 1 session.  After that his therapist went on vacation and he was told to schedule with another therapist but no appointments were available.  He then made an appointment with his previous therapist who canceled on him.  He now has an appointment later this week.  That will be his second PT visit.    Currently the patient's blood pressure is again quite elevated.  I suspect there is a degree of whitecoat hypertension.  He is tachycardic which has been a problem for him whenever he comes into the doctor.  He does not have chest pain, dyspnea, headache, focal neurologic symptoms.    Allergies:  No Known Allergies    Problem List:    Patient Active Problem List    Diagnosis Date Noted     Asymptomatic hypertensive urgency 09/20/2022     Priority: Medium     Tenosynovitis, suppurative  "09/19/2022     Priority: Medium     Uncontrolled hypertension 09/19/2022     Priority: Medium     Essential hypertension with goal blood pressure less than 130/80 10/25/2011     Priority: Medium        Past Medical History:    No past medical history on file.    Past Surgical History:    Past Surgical History:   Procedure Laterality Date     INCISION AND DRAINAGE FINGER, COMBINED Left 9/20/2022    Procedure: Irrigation and  Debridement Left Long Finger;  Surgeon: Vance Stephens MD;  Location: [facility] OR       Family History:    Family History   Problem Relation Age of Onset     Cancer Mother 58        liver CA     Hypertension Mother      Hypertension Father      Cerebrovascular Disease Paternal Grandmother      Asthma No family hx of      C.A.D. No family hx of      Diabetes No family hx of      Prostate Cancer No family hx of      Cancer - colorectal No family hx of        Social History:  Marital Status:  Single [1]  Social History     Tobacco Use     Smoking status: Never     Smokeless tobacco: Never   Vaping Use     Vaping Use: Never used   Substance Use Topics     Alcohol use: Yes     Comment: occ     Drug use: No        Medications:    lisinopril (ZESTRIL) 40 MG tablet  Multiple Vitamins-Minerals (MULTIVITAMIN GUMMIES MENS) CHEW  triamterene-HCTZ (MAXZIDE) 75-50 MG tablet      Review of Systems     All other systems are reviewed and are negative    Physical Exam   BP: (!) 219/130  Pulse: (!) 138  Temp: 98.4  F (36.9  C)  Resp: 20  Height: 177.8 cm (5' 10\")  Weight: 104.3 kg (230 lb)  SpO2: 97 %      Physical Exam     Nursing note and vitals were reviewed.  Constitutional: Awake and alert, adequately nourished and developed appearing 57-year-old in no apparent discomfort, who does not appear acutely ill, and who answers questions appropriately and cooperates with examination.  HEENT: EOMI.   Pulmonary/Chest: Breathing is unlabored.    Musculoskeletal: Examination of the left hand reveals erythema without " induration or warmth on the middle phalanx extending onto the palm of the hand as in the photos below and markedly decreased range of motion on flexion at the DIP PIP and MP joints of the middle finger and at the MP joints of the index and ring finger.  Fingertips are warm and well-perfused.  Doug's test reveals good perfusion in both the ulnar and radial arteries.  The radial artery is easily palpable.  Neurological: Alert, oriented, thought content logical, coherent   Skin: Warm, dry, no rashes.  Psychiatric: Affect broad and appropriate.              ED Course                 Procedures              Critical Care time:  none               Results for orders placed or performed during the hospital encounter of 10/18/22 (from the past 24 hour(s))   CBC with platelets differential    Narrative    The following orders were created for panel order CBC with platelets differential.  Procedure                               Abnormality         Status                     ---------                               -----------         ------                     CBC with platelets and d...[[id number]]  Abnormal            Final result                 Please view results for these tests on the individual orders.   Basic metabolic panel   Result Value Ref Range    Sodium 140 136 - 145 mmol/L    Potassium 4.1 3.4 - 5.3 mmol/L    Chloride 100 98 - 107 mmol/L    Carbon Dioxide (CO2) 29 22 - 29 mmol/L    Anion Gap 11 7 - 15 mmol/L    Urea Nitrogen 23.6 (H) 6.0 - 20.0 mg/dL    Creatinine 1.37 (H) 0.67 - 1.17 mg/dL    Calcium 9.5 8.6 - 10.0 mg/dL    Glucose 98 70 - 99 mg/dL    GFR Estimate 60 (L) >60 mL/min/1.73m2   CRP inflammation   Result Value Ref Range    CRP Inflammation 3.19 <5.00 mg/L   CBC with platelets and differential   Result Value Ref Range    WBC Count 7.6 4.0 - 11.0 10e3/uL    RBC Count 5.54 4.40 - 5.90 10e6/uL    Hemoglobin 15.7 13.3 - 17.7 g/dL    Hematocrit 47.1 40.0 - 53.0 %    MCV 85 78 - 100 fL    MCH 28.3 26.5 -  33.0 pg    MCHC 33.3 31.5 - 36.5 g/dL    RDW 12.8 10.0 - 15.0 %    Platelet Count 256 150 - 450 10e3/uL    % Neutrophils 32 %    % Lymphocytes 47 %    % Monocytes 9 %    % Eosinophils 11 %    % Basophils 1 %    % Immature Granulocytes 0 %    NRBCs per 100 WBC 0 <1 /100    Absolute Neutrophils 2.4 1.6 - 8.3 10e3/uL    Absolute Lymphocytes 3.6 0.8 - 5.3 10e3/uL    Absolute Monocytes 0.7 0.0 - 1.3 10e3/uL    Absolute Eosinophils 0.8 (H) 0.0 - 0.7 10e3/uL    Absolute Basophils 0.1 0.0 - 0.2 10e3/uL    Absolute Immature Granulocytes 0.0 <=0.4 10e3/uL    Absolute NRBCs 0.0 10e3/uL       Medications   ceFAZolin (ANCEF) 2 g in 100 mL D5W intermittent infusion (0 g Intravenous Stopped 10/18/22 1107)       Assessments & Plan (with Medical Decision Making)     57-year-old male presented with increased stiffness in the left hand after a bout of septic tenosynovitis due to a strep group C G organism.  He has not really been able to get adequate PT.  He has had only 1 session.  I discussed his situation with Dr. Silva on-call and orthopedics who said that PT is the most important thing.  He is not having significant pain and his CRP is normal and his CBC is normal and I have no suspicion of significant infection but will cover him with an additional week of cephalexin.  He has scheduled follow-up in hand surgery and has scheduled another PT appointment.  He should return if he is developing increasing pain, redness, or other worrisome symptoms.    In addition he remains hypertensive.  He is asymptomatic.  No doubt a considerable part of this is some whitecoat hypertension but I would consider work-up for secondary causes of hypertension in him.  He has scheduled follow-up in primary care within a week.  He should be seen if he develops symptoms such as chest pain or focal neurologic symptoms and continue on his current medication therapy.    I have reviewed the nursing notes.    I have reviewed the findings, diagnosis, plan  and need for follow up with the patient.       New Prescriptions    No medications on file       Final diagnoses:   Tenosynovitis of hand   Hypertension, unspecified type       10/18/2022   [facility] EMERGENCY DEPT     Eddie Amin MD  10/18/22 2202

## 2022-10-24 ENCOUNTER — TRANSFERRED RECORDS (OUTPATIENT)
Dept: HEALTH INFORMATION MANAGEMENT | Facility: CLINIC | Age: 57
End: 2022-10-24

## 2022-11-11 DIAGNOSIS — I10 ESSENTIAL HYPERTENSION WITH GOAL BLOOD PRESSURE LESS THAN 130/80: ICD-10-CM

## 2022-11-11 DIAGNOSIS — R79.89 ELEVATED SERUM CREATININE: Primary | ICD-10-CM

## 2022-11-12 RX ORDER — TRIAMTERENE AND HYDROCHLOROTHIAZIDE 75; 50 MG/1; MG/1
1 TABLET ORAL DAILY
Qty: 90 TABLET | Refills: 0 | Status: SHIPPED | OUTPATIENT
Start: 2022-11-12 | End: 2022-11-16

## 2022-11-12 NOTE — TELEPHONE ENCOUNTER
Please call patient to assist in scheduling RN BP recheck, and lab appointment to recheck renal function.

## 2022-11-14 ENCOUNTER — TRANSFERRED RECORDS (OUTPATIENT)
Dept: HEALTH INFORMATION MANAGEMENT | Facility: CLINIC | Age: 57
End: 2022-11-14

## 2022-11-16 ENCOUNTER — OFFICE VISIT (OUTPATIENT)
Dept: FAMILY MEDICINE | Facility: CLINIC | Age: 57
End: 2022-11-16
Payer: COMMERCIAL

## 2022-11-16 VITALS
SYSTOLIC BLOOD PRESSURE: 156 MMHG | HEIGHT: 71 IN | WEIGHT: 239.6 LBS | TEMPERATURE: 98.4 F | RESPIRATION RATE: 12 BRPM | OXYGEN SATURATION: 98 % | DIASTOLIC BLOOD PRESSURE: 98 MMHG | BODY MASS INDEX: 33.54 KG/M2 | HEART RATE: 93 BPM

## 2022-11-16 DIAGNOSIS — I10 ESSENTIAL HYPERTENSION WITH GOAL BLOOD PRESSURE LESS THAN 130/80: Primary | ICD-10-CM

## 2022-11-16 DIAGNOSIS — Z23 HIGH PRIORITY FOR 2019-NCOV VACCINE: ICD-10-CM

## 2022-11-16 DIAGNOSIS — Z12.11 SCREEN FOR COLON CANCER: ICD-10-CM

## 2022-11-16 DIAGNOSIS — N18.2 CKD (CHRONIC KIDNEY DISEASE) STAGE 2, GFR 60-89 ML/MIN: ICD-10-CM

## 2022-11-16 DIAGNOSIS — R79.89 ELEVATED SERUM CREATININE: ICD-10-CM

## 2022-11-16 LAB
ANION GAP SERPL CALCULATED.3IONS-SCNC: 8 MMOL/L (ref 7–15)
BUN SERPL-MCNC: 25.4 MG/DL (ref 6–20)
CALCIUM SERPL-MCNC: 9 MG/DL (ref 8.6–10)
CHLORIDE SERPL-SCNC: 101 MMOL/L (ref 98–107)
CREAT SERPL-MCNC: 1.34 MG/DL (ref 0.67–1.17)
DEPRECATED HCO3 PLAS-SCNC: 32 MMOL/L (ref 22–29)
GFR SERPL CREATININE-BSD FRML MDRD: 62 ML/MIN/1.73M2
GLUCOSE SERPL-MCNC: 87 MG/DL (ref 70–99)
POTASSIUM SERPL-SCNC: 3.9 MMOL/L (ref 3.4–5.3)
SODIUM SERPL-SCNC: 141 MMOL/L (ref 136–145)

## 2022-11-16 PROCEDURE — 91313 COVID-19,PF,MODERNA BIVALENT: CPT | Performed by: FAMILY MEDICINE

## 2022-11-16 PROCEDURE — 99214 OFFICE O/P EST MOD 30 MIN: CPT | Mod: 25 | Performed by: FAMILY MEDICINE

## 2022-11-16 PROCEDURE — 0134A COVID-19,PF,MODERNA BIVALENT: CPT | Performed by: FAMILY MEDICINE

## 2022-11-16 PROCEDURE — 36415 COLL VENOUS BLD VENIPUNCTURE: CPT | Performed by: FAMILY MEDICINE

## 2022-11-16 PROCEDURE — 80048 BASIC METABOLIC PNL TOTAL CA: CPT | Performed by: FAMILY MEDICINE

## 2022-11-16 RX ORDER — TRIAMTERENE AND HYDROCHLOROTHIAZIDE 75; 50 MG/1; MG/1
1 TABLET ORAL DAILY
Qty: 90 TABLET | Refills: 0 | Status: SHIPPED | OUTPATIENT
Start: 2022-11-16 | End: 2023-02-11

## 2022-11-16 RX ORDER — AMLODIPINE BESYLATE 2.5 MG/1
2.5 TABLET ORAL DAILY
Qty: 90 TABLET | Refills: 0 | Status: SHIPPED | OUTPATIENT
Start: 2022-11-16 | End: 2022-12-01

## 2022-11-16 ASSESSMENT — PAIN SCALES - GENERAL: PAINLEVEL: NO PAIN (1)

## 2022-11-16 NOTE — PATIENT INSTRUCTIONS
You may get the Shingrix vaccine for shingles if you desire, and after you verify with insurance how they cover the vaccine.     Start amlodipine 2.5 mg daily.  All other medications to remain the same.    Measure resting blood pressure at home at least once a day, and as needed if you have dizziness or other symptoms.  You may log measurements in a small notebook.    Goal blood pressure is less than 130/80    Contact the care team if your blood pressures are frequently out of range or if you have any concerning symptoms.     Be consistent with low salt, low trans fat and low saturated fat diet.  Eat food rich in omega-3-fatty acids as you tolerate. (salmon, olive oil)  Eat 5 cups of vegetables, fruits and whole grains per day.  Limit starchy food (white rice, white bread, white pasta, white potatoes) to less than a cup per meal.  Minimize sweets, junk food and fastfood. Limit soda beverages to one serving per day; best to avoid it altogether though.    Exercise: moderate intensity sustained for at least 30 mins per episode, goal of 150 mins per week at least  Combine cardiovascular and resistance exercises.  These exercise recommendations are in addition to your daily activity at work or home.  Work on losing weight.    Blood tests: You will be contacted in 1-2 days for results of your lab tests.      Be sure to complete your colonoscopy as scheduled in January 2023.

## 2022-11-16 NOTE — PROGRESS NOTES
Assessment & Plan     Essential hypertension with goal blood pressure less than 130/80  Patient was advised BP uncontrolled today.  Reviewed meds with patient.  Added amlodipine 2.5 mg daily to optimize management.  Reinforced sodium restriction.  Exercise recommendations reviewed with patient.  Recheck with RN in 2 weeks   - triamterene-HCTZ (MAXZIDE) 75-50 MG tablet  Dispense: 90 tablet; Refill: 0  - Basic metabolic panel  - amLODIPine (NORVASC) 2.5 MG tablet  Dispense: 90 tablet; Refill: 0  - Basic metabolic panel    Elevated serum creatinine  Closely monitor for worsening.   May need to discontinue duretic if worsening on today's lab.  Drink plenty of water everyday.   Avoid taking Ibuprofen or Naproxen (NSAIDs)  - Basic metabolic panel  - Basic metabolic panel    Screen for colon cancer  Has appt in January 2022    High priority for 2019-nCoV vaccine  - COVID-19,PF,MODERNA BIVALENT 18+Yrs      Patient Instructions   You may get the Shingrix vaccine for shingles if you desire, and after you verify with insurance how they cover the vaccine.     Start amlodipine 2.5 mg daily.  All other medications to remain the same.    Measure resting blood pressure at home at least once a day, and as needed if you have dizziness or other symptoms.  You may log measurements in a small notebook.    Goal blood pressure is less than 130/80    Contact the care team if your blood pressures are frequently out of range or if you have any concerning symptoms.     Be consistent with low salt, low trans fat and low saturated fat diet.  Eat food rich in omega-3-fatty acids as you tolerate. (salmon, olive oil)  Eat 5 cups of vegetables, fruits and whole grains per day.  Limit starchy food (white rice, white bread, white pasta, white potatoes) to less than a cup per meal.  Minimize sweets, junk food and fastfood. Limit soda beverages to one serving per day; best to avoid it altogether though.    Exercise: moderate intensity sustained for  at least 30 mins per episode, goal of 150 mins per week at least  Combine cardiovascular and resistance exercises.  These exercise recommendations are in addition to your daily activity at work or home.  Work on losing weight.    Blood tests: You will be contacted in 1-2 days for results of your lab tests.      Be sure to complete your colonoscopy as scheduled in January 2023.      Return in about 2 weeks (around 11/30/2022) for Blood pressure recheck with.    Dejuan Cintron MD  [facility] is [facility] 57 year [facility] presenting for the following health issues:  Hypertension      History of Present Illness       Hypertension: He presents for follow up of hypertension.  He does not check blood pressure  regularly outside of the clinic. Outside blood pressures have been over 140/90. He follows a low salt diet.       Denies chest pain, dyspnea, HA, BOV, dizziness or urinary changes.    Patient said his home BP monitor needs new batteries but he has not obntained one yet.    Patient Active Problem List   Diagnosis     Essential hypertension with goal blood pressure less than 130/80     Tenosynovitis, suppurative     Uncontrolled hypertension     Asymptomatic hypertensive urgency     Past Surgical History:   Procedure Laterality Date     INCISION AND DRAINAGE FINGER, COMBINED Left 9/20/2022    Procedure: Irrigation and  Debridement Left Long Finger;  Surgeon: Vance Stephens MD;  Location: [facility] OR       Social History     Tobacco Use     Smoking status: Never     Smokeless tobacco: Never   Substance Use Topics     Alcohol use: Yes     Comment: occ     Family History   Problem Relation Age of Onset     Cancer Mother 58        liver CA     Hypertension Mother      Hypertension Father      Cerebrovascular Disease Paternal Grandmother      Asthma No family hx of      C.A.D. No family hx of      Diabetes No family hx of      Prostate Cancer No family hx of      Cancer - colorectal No  "family hx of          Current Outpatient Medications   Medication Sig Dispense Refill     lisinopril (ZESTRIL) 40 MG tablet Take 1 tablet (40 mg) by mouth daily 90 tablet 3     Multiple Vitamins-Minerals (MULTIVITAMIN GUMMIES MENS) CHEW Take 1 chew tab by mouth daily       triamterene-HCTZ (MAXZIDE) 75-50 MG tablet TAKE 1 TABLET BY MOUTH DAILY 90 tablet 0     No Known Allergies    Review of Systems   Constitutional, HEENT, cardiovascular, pulmonary, GI, , musculoskeletal, neuro, skin, endocrine and psych systems are negative, except as otherwise noted.      Objective    BP (!) 154/96 (BP Location: Left arm, Patient Position: Chair, Cuff Size: Adult Large)   Pulse 93   Temp 98.4  F (36.9  C) (Tympanic)   Resp 12   Ht 1.797 m (5' 10.75\")   Wt 108.7 kg (239 lb 9.6 oz)   SpO2 98%   BMI 33.65 kg/m    Body mass index is 33.65 kg/m .  Physical Exam   GENERAL: obese,  alert and no distress, ambulatory w/o assist  NECK: no tenderness, no adenopathy,  Thyroid not enlarged  RESP: lungs clear to auscultation - no rales, no rhonchi, no wheezes  CV: regular rates and rhythm, no murmur  MS: no edema  SKIN: no suspicious lesions, no rashes  NEURO: strength and tone- normal, sensory exam- grossly normal, mentation- intact, speech- normal, reflexes- symmetric  ABD:  nontender    No results found for any visits on 11/16/22.                "

## 2022-11-17 PROBLEM — N18.2 CKD (CHRONIC KIDNEY DISEASE) STAGE 2, GFR 60-89 ML/MIN: Status: ACTIVE | Noted: 2022-11-17

## 2022-12-01 ENCOUNTER — TELEPHONE (OUTPATIENT)
Dept: FAMILY MEDICINE | Facility: CLINIC | Age: 57
End: 2022-12-01

## 2022-12-01 ENCOUNTER — ALLIED HEALTH/NURSE VISIT (OUTPATIENT)
Dept: FAMILY MEDICINE | Facility: CLINIC | Age: 57
End: 2022-12-01
Payer: COMMERCIAL

## 2022-12-01 VITALS
SYSTOLIC BLOOD PRESSURE: 146 MMHG | DIASTOLIC BLOOD PRESSURE: 94 MMHG | OXYGEN SATURATION: 98 % | RESPIRATION RATE: 18 BRPM | HEART RATE: 92 BPM

## 2022-12-01 DIAGNOSIS — I10 ESSENTIAL HYPERTENSION WITH GOAL BLOOD PRESSURE LESS THAN 130/80: Primary | ICD-10-CM

## 2022-12-01 DIAGNOSIS — I10 ESSENTIAL HYPERTENSION WITH GOAL BLOOD PRESSURE LESS THAN 130/80: ICD-10-CM

## 2022-12-01 PROCEDURE — 99207 PR NO CHARGE NURSE ONLY: CPT

## 2022-12-01 RX ORDER — AMLODIPINE BESYLATE 2.5 MG/1
5 TABLET ORAL DAILY
Qty: 90 TABLET | Refills: 0
Start: 2022-12-01 | End: 2023-01-12

## 2022-12-01 NOTE — TELEPHONE ENCOUNTER
Nicholas Ingram is a 57 year old year old patient who comes in today for a Blood Pressure check because of new medication.  11/16 OV: added amlodipine 2.5mg daily     Vital Signs as repeated by RN   152/94 p92  146/94 p92     Patient is taking medication as prescribed  Patient is tolerating medications well.  Patient is not monitoring Blood Pressure at home.  has not bought BP machine yet  Current complaints: none  Disposition:  Routed to provider for review.  Preferred pharmacy: [facility], [place]     *reinforced low sodium diet, exercise 30min/day.     Mary Chung RN

## 2022-12-01 NOTE — TELEPHONE ENCOUNTER
Increase amlodipine 2.5 mg to two tablets per day.  Continue lisinopril and triamterene-hydrochlorothiazide as prescribed.  Be consistent with sodium restrictions.  Recheck BP in 2 weeks with a RN visit.

## 2022-12-01 NOTE — PROGRESS NOTES
Nicholas Martinez is a 57 year old year old patient who comes in today for a Blood Pressure check because of new medication.  11/16 OV: added amlodipine 2.5mg daily    Vital Signs as repeated by RN   152/94 p92  146/94 p92    Patient is taking medication as prescribed  Patient is tolerating medications well.  Patient is not monitoring Blood Pressure at home.  has not bought BP machine yet  Current complaints: none  Disposition:  Routed to provider for review.  Preferred pharmacy: [facility], [place]    *reinforced low sodium diet, exercise 30min/day.    Mary Chung RN

## 2022-12-02 NOTE — TELEPHONE ENCOUNTER
Attempt to call both numbers & unable to leave msg at either. Mobile number states mailbox full & home number does not ring or go to voicemail.  Pt is not active in [email or web address].  Will need to re-call.    Mary Chung RN

## 2022-12-02 NOTE — TELEPHONE ENCOUNTER
Called pt & read providers message.  Pt verbalized understanding. Will call back to schedule Rn BP check.    Mary Chung RN

## 2022-12-12 ENCOUNTER — TRANSFERRED RECORDS (OUTPATIENT)
Dept: HEALTH INFORMATION MANAGEMENT | Facility: CLINIC | Age: 57
End: 2022-12-12

## 2023-01-12 ENCOUNTER — MYC REFILL (OUTPATIENT)
Dept: FAMILY MEDICINE | Facility: CLINIC | Age: 58
End: 2023-01-12
Payer: COMMERCIAL

## 2023-01-12 DIAGNOSIS — I10 ESSENTIAL HYPERTENSION WITH GOAL BLOOD PRESSURE LESS THAN 130/80: ICD-10-CM

## 2023-01-16 ENCOUNTER — TELEPHONE (OUTPATIENT)
Dept: GASTROENTEROLOGY | Facility: CLINIC | Age: 58
End: 2023-01-16

## 2023-01-16 RX ORDER — BISACODYL 5 MG
TABLET, DELAYED RELEASE (ENTERIC COATED) ORAL
Qty: 4 TABLET | Refills: 0 | Status: SHIPPED | OUTPATIENT
Start: 2023-01-16 | End: 2023-03-15

## 2023-01-16 NOTE — TELEPHONE ENCOUNTER
"    Screening Questions  BLUE  KIND OF PREP RED  LOCATION [review exclusion criteria] GREEN  SEDATION TYPE        Y Are you active on mychart?       SINGER Ordering/Referring Provider?        N What type of coverage do you have?      n Have you had a positive covid test in the last 14 days?     33.65 1. BMI  [BMI 40+ - review exclusion criteria]    y  2. Are you able to give consent for your medical care? [IF NO,RN REVIEW]          n  3. Are you taking any prescription pain medications on a routine schedule   (ex narcotics: tramadol, oxycodone, roxicodone, oxycontin,  and percocet)?        n  3a. EXTENDED PREP What kind of prescription?     n 4. Do you have any chemical dependencies such as alcohol, street drugs, or methadone?        **If yes 3- 5 , please schedule with MAC sedation.**          IF YES TO ANY 6 - 10 - HOSPITAL SETTING ONLY.     n 6.   Do you need assistance transferring?     n 7.   Have you had a heart or lung transplant?    n 8.   Are you currently on dialysis?   n 9.   Do you use daily home oxygen?   n 10. Do you take nitroglycerin?   10a. n If yes, how often?     11. [FEMALES]  n Are you currently pregnant?    11a. n If yes, how many weeks? [ Greater than 12 weeks, OR NEEDED]    n 12. Do you have Pulmonary Hypertension? *NEED PAC APPT AT [facility]*     n 13. [review exclusion criteria]  Do you have any implantable devices in your body (pacemaker, defib, LVAD)?    n 14. In the past 6 months, have you had any heart related issues including cardiomyopathy or heart attack?     14a. n If yes, did it require cardiac stenting if so when?     n 15. Have you had a stroke or Transient ischemic attack (TIA - aka  mini stroke ) within 6 months?      n 16. Do you have mod to severe Obstructive Sleep Apnea?  [Hospital only]    n 17. Do you have SEVERE AND UNCONTROLLED asthma? *NEED PAC APPT AT [facility]*     n 18. Are you currently taking any blood thinners?     18a. If yes, inform patient to \"follow up w/ ordering " "provider for bridging instructions.\"    n 19. Do you take the medication Phentermine?    19a. If yes, \"Hold for 7 days before procedure.  Please consult your prescribing provider if you have questions about holding this medication.\"     n  20. Do you have chronic kidney disease?      n  21. Do you have a diagnosis of diabetes?     n  22. On a regular basis do you go 3-5 days between bowel movements?     y 23. Preferred LOCAL Pharmacy for Pre Prescription    [ LIST ONLY ONE PHARMACY]        [place], [place]        - CLOSING REMINDERS -    Informed patient they will need an adult    Cannot take any type of public or medical transportation alone    Conscious Sedation- Needs  for 6 hours after the procedure       MAC/General-Needs  for 24 hours after procedure    Pre-Procedure Covid test to be completed [[facility] PCR Testing Required]    Confirmed Nurse will call to complete assessment       - SCHEDULING DETAILS -  N Hospital Setting Required? If yes, what is the exclusion?: N   WATERS  Surgeon    1/20/23  Date of Procedure  Lower Endoscopy [Colonoscopy]  Type of Procedure Scheduled  [facility]-If you answer yes to questions #8, #20, #21Which Colonoscopy Prep was Sent?     MAC Sedation Type     NA PAC / Pre-op Required                 "

## 2023-01-17 NOTE — TELEPHONE ENCOUNTER
Pending Prescriptions:                       Disp   Refills    amLODIPine (NORVASC) 2.5 MG tablet        90 tab*0            Sig: Take 2 tablets (5 mg) by mouth daily    Routing refill request to provider for review/approval because:  Labs not current:    BP Readings from Last 3 Encounters:   12/01/22 (!) 146/94   11/16/22 (!) 156/98   10/18/22 (!) 172/94     Creatinine   Date Value Ref Range Status   11/16/2022 1.34 (H) 0.67 - 1.17 mg/dL Final   12/17/2018 0.97 0.66 - 1.25 mg/dL Final         Rigo Mendez RN

## 2023-01-18 ENCOUNTER — ANESTHESIA EVENT (OUTPATIENT)
Dept: GASTROENTEROLOGY | Facility: CLINIC | Age: 58
End: 2023-01-18
Payer: COMMERCIAL

## 2023-01-18 RX ORDER — AMLODIPINE BESYLATE 2.5 MG/1
5 TABLET ORAL DAILY
Qty: 90 TABLET | Refills: 0 | Status: SHIPPED | OUTPATIENT
Start: 2023-01-18 | End: 2023-03-02

## 2023-01-18 NOTE — ANESTHESIA PREPROCEDURE EVALUATION
Anesthesia Pre-Procedure Evaluation    Patient: Nicholas Martinez   MRN: [id number] : 1965        Procedure : Procedure(s):  COLONOSCOPY          No past medical history on file.   Past Surgical History:   Procedure Laterality Date     INCISION AND DRAINAGE FINGER, COMBINED Left 2022    Procedure: Irrigation and  Debridement Left Long Finger;  Surgeon: Vance Stephens MD;  Location: [facility] OR      No Known Allergies   Social History     Tobacco Use     Smoking status: Never     Smokeless tobacco: Never   Substance Use Topics     Alcohol use: Yes     Comment: occ      Wt Readings from Last 1 Encounters:   22 108.7 kg (239 lb 9.6 oz)        Anesthesia Evaluation   Pt has had prior anesthetic. Type: General.        ROS/MED HX  ENT/Pulmonary:  - neg pulmonary ROS     Neurologic:  - neg neurologic ROS     Cardiovascular:     (+) Dyslipidemia hypertension-----    METS/Exercise Tolerance:     Hematologic:  - neg hematologic  ROS     Musculoskeletal:   (+) arthritis,     GI/Hepatic:  - neg GI/hepatic ROS     Renal/Genitourinary:     (+) renal disease, type: CRI,     Endo:     (+) Obesity,     Psychiatric/Substance Use:  - neg psychiatric ROS     Infectious Disease:       Malignancy:  - neg malignancy ROS     Other:  - neg other ROS          Physical Exam    Airway        Mallampati: II   TM distance: > 3 FB   Neck ROM: full   Mouth opening: > 3 cm    Respiratory Devices and Support         Dental    unable to assess        Cardiovascular   cardiovascular exam normal          Pulmonary   pulmonary exam normal                OUTSIDE LABS:  CBC:   Lab Results   Component Value Date    WBC 7.6 10/18/2022    WBC 8.6 2022    HGB 15.7 10/18/2022    HGB 15.2 2022    HCT 47.1 10/18/2022    HCT 48.7 2022     10/18/2022     2022     BMP:   Lab Results   Component Value Date     2022     10/18/2022    POTASSIUM 3.9 2022    POTASSIUM 4.1 10/18/2022     CHLORIDE 101 11/16/2022    CHLORIDE 100 10/18/2022    CO2 32 (H) 11/16/2022    CO2 29 10/18/2022    BUN 25.4 (H) 11/16/2022    BUN 23.6 (H) 10/18/2022    CR 1.34 (H) 11/16/2022    CR 1.37 (H) 10/18/2022    GLC 87 11/16/2022    GLC 98 10/18/2022     COAGS: No results found for: PTT, INR, FIBR  POC: No results found for: BGM, HCG, HCGS  HEPATIC:   Lab Results   Component Value Date    ALBUMIN 4.3 12/17/2018    PROTTOTAL 7.7 12/17/2018    ALT 25 12/17/2018    AST 24 12/17/2018    ALKPHOS 109 12/17/2018    BILITOTAL 0.8 12/17/2018     OTHER:   Lab Results   Component Value Date    COLE 9.0 11/16/2022    TSH 2.10 09/19/2022    CRP 3.19 10/18/2022       Anesthesia Plan    ASA Status:  2      Anesthesia Type: General.   Induction: Intravenous.           Consents    Anesthesia Plan(s) and associated risks, benefits, and realistic alternatives discussed. Questions answered and patient/representative(s) expressed understanding.    - Discussed:     - Discussed with:  Patient         Postoperative Care    Pain management: IV analgesics, Oral pain medications.   PONV prophylaxis: Ondansetron (or other 5HT-3), Dexamethasone or Solumedrol     Comments:                PATRICIA Banks CRNA

## 2023-01-18 NOTE — TELEPHONE ENCOUNTER
Covering for primary/ordering provider:  One refill sent, please call and ask pt to schedule RN BP check per Dr. Cintron's last note.

## 2023-01-20 ENCOUNTER — HOSPITAL ENCOUNTER (OUTPATIENT)
Facility: CLINIC | Age: 58
Discharge: HOME | End: 2023-01-20
Attending: SURGERY | Admitting: SURGERY
Payer: COMMERCIAL

## 2023-01-20 ENCOUNTER — ANESTHESIA (OUTPATIENT)
Dept: GASTROENTEROLOGY | Facility: CLINIC | Age: 58
End: 2023-01-20
Payer: COMMERCIAL

## 2023-01-20 VITALS
HEART RATE: 83 BPM | BODY MASS INDEX: 33.46 KG/M2 | SYSTOLIC BLOOD PRESSURE: 112 MMHG | OXYGEN SATURATION: 95 % | RESPIRATION RATE: 16 BRPM | DIASTOLIC BLOOD PRESSURE: 77 MMHG | TEMPERATURE: 97.8 F | HEIGHT: 71 IN | WEIGHT: 239 LBS

## 2023-01-20 DIAGNOSIS — Z12.11 SPECIAL SCREENING FOR MALIGNANT NEOPLASMS, COLON: Primary | ICD-10-CM

## 2023-01-20 LAB — COLONOSCOPY: NORMAL

## 2023-01-20 PROCEDURE — 370N000017 HC ANESTHESIA TECHNICAL FEE, PER MIN: Performed by: SURGERY

## 2023-01-20 PROCEDURE — 250N000009 HC RX 250: Performed by: SURGERY

## 2023-01-20 PROCEDURE — 250N000011 HC RX IP 250 OP 636: Performed by: NURSE ANESTHETIST, CERTIFIED REGISTERED

## 2023-01-20 PROCEDURE — 258N000003 HC RX IP 258 OP 636: Performed by: SURGERY

## 2023-01-20 PROCEDURE — 88305 TISSUE EXAM BY PATHOLOGIST: CPT | Mod: TC | Performed by: SURGERY

## 2023-01-20 PROCEDURE — 45385 COLONOSCOPY W/LESION REMOVAL: CPT | Performed by: SURGERY

## 2023-01-20 PROCEDURE — 88305 TISSUE EXAM BY PATHOLOGIST: CPT | Mod: 26

## 2023-01-20 PROCEDURE — 45385 COLONOSCOPY W/LESION REMOVAL: CPT | Mod: PT | Performed by: SURGERY

## 2023-01-20 RX ORDER — LIDOCAINE 40 MG/G
CREAM TOPICAL
Status: DISCONTINUED | OUTPATIENT
Start: 2023-01-20 | End: 2023-01-20 | Stop reason: HOSPADM

## 2023-01-20 RX ORDER — SODIUM CHLORIDE, SODIUM LACTATE, POTASSIUM CHLORIDE, CALCIUM CHLORIDE 600; 310; 30; 20 MG/100ML; MG/100ML; MG/100ML; MG/100ML
INJECTION, SOLUTION INTRAVENOUS CONTINUOUS
Status: DISCONTINUED | OUTPATIENT
Start: 2023-01-20 | End: 2023-01-20 | Stop reason: HOSPADM

## 2023-01-20 RX ORDER — PROPOFOL 10 MG/ML
INJECTION, EMULSION INTRAVENOUS CONTINUOUS PRN
Status: DISCONTINUED | OUTPATIENT
Start: 2023-01-20 | End: 2023-01-20

## 2023-01-20 RX ORDER — PROPOFOL 10 MG/ML
INJECTION, EMULSION INTRAVENOUS PRN
Status: DISCONTINUED | OUTPATIENT
Start: 2023-01-20 | End: 2023-01-20

## 2023-01-20 RX ADMIN — PROPOFOL 100 MG: 10 INJECTION, EMULSION INTRAVENOUS at 08:59

## 2023-01-20 RX ADMIN — LIDOCAINE HYDROCHLORIDE 0.2 ML: 10 INJECTION, SOLUTION EPIDURAL; INFILTRATION; INTRACAUDAL; PERINEURAL at 08:40

## 2023-01-20 RX ADMIN — PROPOFOL 200 MCG/KG/MIN: 10 INJECTION, EMULSION INTRAVENOUS at 09:00

## 2023-01-20 RX ADMIN — SODIUM CHLORIDE, POTASSIUM CHLORIDE, SODIUM LACTATE AND CALCIUM CHLORIDE: 600; 310; 30; 20 INJECTION, SOLUTION INTRAVENOUS at 08:41

## 2023-01-20 ASSESSMENT — ACTIVITIES OF DAILY LIVING (ADL): ADLS_ACUITY_SCORE: 35

## 2023-01-20 NOTE — ANESTHESIA POSTPROCEDURE EVALUATION
Patient: Nicholas Martinez    Procedure: Procedure(s):  COLONOSCOPY, FLEXIBLE, WITH LESION REMOVAL USING SNARE       Anesthesia Type:  General    Note:  Disposition: Outpatient   Postop Pain Control: Uneventful            Sign Out: Well controlled pain   PONV: No   Neuro/Psych: Uneventful            Sign Out: Acceptable/Baseline neuro status   Airway/Respiratory: Uneventful            Sign Out: Acceptable/Baseline resp. status   CV/Hemodynamics: Uneventful            Sign Out: Acceptable CV status; No obvious hypovolemia; No obvious fluid overload   Other NRE: NONE   DID A NON-ROUTINE EVENT OCCUR? No           Last vitals:  Vitals Value Taken Time   /77 01/20/23 1000   Temp 36.6  C (97.8  F) 01/20/23 0945   Pulse 83 01/20/23 1000   Resp 16 01/20/23 1000   SpO2 94 % 01/20/23 1001   Vitals shown include unvalidated device data.    Electronically Signed By: PATRICIA Ellington CRNA  January 20, 2023  10:18 AM

## 2023-01-20 NOTE — ANESTHESIA CARE TRANSFER NOTE
Patient: Nicholas Martinez    Procedure: Procedure(s):  COLONOSCOPY, FLEXIBLE, WITH LESION REMOVAL USING SNARE       Diagnosis: Screen for colon cancer [Z12.11]  Diagnosis Additional Information: No value filed.    Anesthesia Type:   General     Note:    Oropharynx: oropharynx clear of all foreign objects and spontaneously breathing  Level of Consciousness: drowsy  Oxygen Supplementation: nasal cannula  Level of Supplemental Oxygen (L/min / FiO2): 2  Independent Airway: airway patency satisfactory and stable  Dentition: dentition unchanged  Vital Signs Stable: post-procedure vital signs reviewed and stable  Report to RN Given: handoff report given  Patient transferred to: Phase II    Handoff Report: Identifed the Patient, Identified the Reponsible Provider, Reviewed the pertinent medical history, Discussed the surgical course, Reviewed Intra-OP anesthesia mangement and issues during anesthesia, Set expectations for post-procedure period and Allowed opportunity for questions and acknowledgement of understanding      Vitals:  Vitals Value Taken Time   BP     Temp     Pulse     Resp     SpO2         Electronically Signed By: PATRICIA Ellington CRNA  January 20, 2023  9:17 AM

## 2023-01-20 NOTE — H&P
[facility]    Pre-Endoscopy History and Physical     Nicholas Martinez MRN# [id number]   YOB: 1965 Age: 58 year old     Date of Procedure: 1/20/2023  Primary care provider: Dejuan Cintron  Type of Endoscopy: Colonoscopy with possible biopsy, possible polypectomy  Reason for Procedure: screening  Type of Anesthesia Anticipated: MAC    HPI:    Nicholas is a 58 year old male who will be undergoing the above procedure.  1st screening; no blood thinner; no famhx of colon cancer    A history and physical has been performed. The patient's medications and allergies have been reviewed. The risks and benefits of the procedure and the sedation options and risks were discussed with the patient.  All questions were answered and informed consent was obtained.      He denies a personal or family history of anesthesia complications or bleeding disorders.     Patient Active Problem List   Diagnosis     Essential hypertension with goal blood pressure less than 130/80     Tenosynovitis, suppurative     Uncontrolled hypertension     Asymptomatic hypertensive urgency     CKD (chronic kidney disease) stage 2, GFR 60-89 ml/min        No past medical history on file.     Past Surgical History:   Procedure Laterality Date     INCISION AND DRAINAGE FINGER, COMBINED Left 9/20/2022    Procedure: Irrigation and  Debridement Left Long Finger;  Surgeon: Vance Stephens MD;  Location: [facility] OR       Social History     Tobacco Use     Smoking status: Never     Smokeless tobacco: Never   Substance Use Topics     Alcohol use: Yes     Comment: occ       Family History   Problem Relation Age of Onset     Cancer Mother 58        liver CA     Hypertension Mother      Hypertension Father      Cerebrovascular Disease Paternal Grandmother      Asthma No family hx of      C.A.D. No family hx of      Diabetes No family hx of      Prostate Cancer No family hx of      Cancer - colorectal No family hx of        Prior  "to Admission medications    Medication Sig Start Date End Date Taking? Authorizing Provider   bisacodyl (DULCOLAX) 5 MG EC tablet Take 2 tablets at 3 pm the day before your procedure. If your procedure is before 11 am, take 2 additional tablets at 11 pm. If your procedure is after 11 am, take 2 additional tablets at 6 am. For additional instructions refer to your colonoscopy prep instructions. 1/16/23  Yes Cesar Blanca MD   lisinopril (ZESTRIL) 40 MG tablet Take 1 tablet (40 mg) by mouth daily 10/11/22  Yes Dejuan Cintron MD   Multiple Vitamins-Minerals (MULTIVITAMIN GUMMIES MENS) CHEW Take 1 chew tab by mouth daily   Yes Reported, Patient   polyethylene glycol (GOLYTELY) 236 g suspension The night before the exam at 6 pm drink an 8-ounce glass every 15 minutes until the jug is half empty. If you arrive before 11 AM: Drink the other half of the Golytely jug at 11 PM night before procedure. If you arrive after 11 AM: Drink the other half of the Golytely jug at 6 AM day of procedure. For additional instructions refer to your colonoscopy prep instructions. 1/16/23  Yes Cesar Blanca MD   triamterene-HCTZ (MAXZIDE) 75-50 MG tablet Take 1 tablet by mouth daily 11/16/22  Yes Dejuan Cintron MD   amLODIPine (NORVASC) 2.5 MG tablet Take 2 tablets (5 mg) by mouth daily 1/18/23   Shelbi Butler, PATRICIA CNP       No Known Allergies     REVIEW OF SYSTEMS:   5 point ROS negative except as noted above in HPI, including Gen., Resp., CV, GI &  system review.    PHYSICAL EXAM:   BP (!) 180/90 (BP Location: Right arm)   Pulse (!) 122   Temp 98  F (36.7  C) (Oral)   Resp 18   Ht 1.797 m (5' 10.75\")   Wt 108.4 kg (239 lb)   SpO2 97%   BMI 33.57 kg/m   Estimated body mass index is 33.57 kg/m  as calculated from the following:    Height as of this encounter: 1.797 m (5' 10.75\").    Weight as of this encounter: 108.4 kg (239 lb).   Constitutional: Awake, alert, no acute distress.  Eyes: No scleral " icterus.  Conjunctiva are without injection.  ENMT: Mucous membranes moist, dentition and gums are intact.   Neck: Soft, supple, trachea midline.    Endocrine: n/a   Lymphatic: There is no cervical, submandibularadenopathy.  Respiratory: normal effortgs   Cardiovascular: S1, S2  Abdomen: Non-distended, non-tender,  No masses,  Musculoskeletal: No spinal or CVA tenderness. Full range of motion in the upper and lower extremities.    Skin: No skin rashes or lesions to inspection.  No petechia.    Neurologic: alerted and oriented 3x  Psychiatric: The patient's affect is not blunted and mood is appropriate.  DIAGNOSTICS:    Not indicated    IMPRESSION   ASA Class 2 - Mild systemic disease    PLAN:   Plan for Colonoscopy with possible biopsy, possible polypectomy. We discussed the risks, benefits and alternatives and the patient wished to proceed.  Patient is cleared for the above procedure.    The above has been forwarded to the consulting provider.    [facility], [facility]

## 2023-01-20 NOTE — LETTER
Nicholas Martinez  [address] [address]    January 26, 2023    Dear Nicholas,  This letter is written to inform you of the results of your recent colonoscopy.  Your examination showed polyp(s) in your sigmoid colon. All polyps were removed in their entirety and sent for review by a pathologist. As you will see on the pathology report below, the tissue(s) were tubular adenomatous polyps. Your examination was otherwise without abnormality.    Final Diagnosis   Sigmoid colon, polyp, biopsy/polypectomy:  -Fragments of tubular adenoma(s); negative for high-grade dysplasia and malignancy.     Adenomatous polyps are entirely benign (non-cancerous); however, patients who have developed these polyps are at an increased risk for developing additional polyps in the future. If these are not eventually removed, there is a risk of developing colon cancer. We will advise more frequent examinations with you because of the risk associated with this type of polyp.    Given these findings,  I recommend that you undergo a repeat colonoscopy in 5 year(s) for surveillance. We will enter you into a recall system so you receive a reminder closer to the time that you are due for repeat examination.     Please remember that this recommendation is made with the understanding that you are not experiencing persistent changes in bowel function, bleeding per rectum, and/or significant abdominal pain. If you experience these symptoms, please contact your primary care provider for a further evaluation.     If you have any questions or concerns about the results of your colonoscopy or the appropriate follow-up, please contact my assistant at [phone].      Sincerely,        [facility],   [facility]  ___

## 2023-01-23 ENCOUNTER — TRANSFERRED RECORDS (OUTPATIENT)
Dept: HEALTH INFORMATION MANAGEMENT | Facility: CLINIC | Age: 58
End: 2023-01-23
Payer: COMMERCIAL

## 2023-01-24 LAB
PATH REPORT.COMMENTS IMP SPEC: NORMAL
PATH REPORT.COMMENTS IMP SPEC: NORMAL
PATH REPORT.FINAL DX SPEC: NORMAL
PATH REPORT.GROSS SPEC: NORMAL
PATH REPORT.MICROSCOPIC SPEC OTHER STN: NORMAL
PATH REPORT.RELEVANT HX SPEC: NORMAL
PHOTO IMAGE: NORMAL

## 2023-03-06 ENCOUNTER — TRANSFERRED RECORDS (OUTPATIENT)
Dept: HEALTH INFORMATION MANAGEMENT | Facility: CLINIC | Age: 58
End: 2023-03-06
Payer: COMMERCIAL

## 2023-03-15 ENCOUNTER — OFFICE VISIT (OUTPATIENT)
Dept: FAMILY MEDICINE | Facility: CLINIC | Age: 58
End: 2023-03-15
Payer: COMMERCIAL

## 2023-03-15 VITALS
DIASTOLIC BLOOD PRESSURE: 100 MMHG | HEART RATE: 94 BPM | SYSTOLIC BLOOD PRESSURE: 156 MMHG | OXYGEN SATURATION: 99 % | WEIGHT: 243.6 LBS | BODY MASS INDEX: 34.1 KG/M2 | RESPIRATION RATE: 12 BRPM | TEMPERATURE: 98.2 F | HEIGHT: 71 IN

## 2023-03-15 DIAGNOSIS — I10 UNCONTROLLED HYPERTENSION: ICD-10-CM

## 2023-03-15 PROCEDURE — 99214 OFFICE O/P EST MOD 30 MIN: CPT | Performed by: FAMILY MEDICINE

## 2023-03-15 RX ORDER — AMLODIPINE BESYLATE 10 MG/1
10 TABLET ORAL DAILY
Qty: 90 TABLET | Refills: 3 | Status: SHIPPED | OUTPATIENT
Start: 2023-03-15 | End: 2024-01-10

## 2023-03-15 ASSESSMENT — PAIN SCALES - GENERAL: PAINLEVEL: NO PAIN (0)

## 2023-03-15 NOTE — PROGRESS NOTES
Assessment & Plan     Uncontrolled hypertension  Patient was advised BP uncontrolled today.  Reviewed meds with patient.  Increased amlodipine to optimize management.  Reinforced sodium restriction.  Exercise recommendations reviewed with patient.  Recheck with RN in 2 weeks   - amLODIPine (NORVASC) 10 MG tablet  Dispense: 90 tablet; Refill: 3      Patient Instructions   Increase amlodipine to 10 mg daily.  Continue all other medications unchanged.    Be consistent with low salt, low trans fat and low saturated fat diet.  Eat food rich in omega-3-fatty acids as you tolerate. (salmon, olive oil)  Eat 5 cups of vegetables, fruits and whole grains per day.  Limit starchy food (white rice, white bread, white pasta, white potatoes) to less than a cup per meal.  Minimize sweets, junk food and fastfood. Limit soda beverages to one serving per day; best to avoid it altogether though.    Exercise: moderate intensity sustained for at least 30 mins per episode, goal of 150 mins per week at least  Combine cardiovascular and resistance exercises.  These exercise recommendations are in addition to your daily activity at work or home.  Work on losing weight.    Measure resting blood pressure at home at least once a day, and as needed if you have dizziness or other symptoms.  You may log measurements in a small notebook.    Goal blood pressure is less than 140/90    Contact the care team if your blood pressures are frequently out of range or if you have any concerning symptoms.           Return in about 2 weeks (around 3/29/2023) for Blood pressure recheck with the nurse; bring home blood pressure monitor.    Dejuan Cintron MD  [facility]    Slade Peterson is a 58 year old presenting for the following health issues:  Hypertension (Recheck and refill meds/)      History of Present Illness       Hypertension: He presents for follow up of hypertension.  He does not check blood pressure  regularly  "outside of the clinic. Outside blood pressures have been over 140/90. He follows a low salt diet.     He eats 0-1 servings of fruits and vegetables daily.He consumes 1 sweetened beverage(s) daily.He exercises with enough effort to increase his heart rate 9 or less minutes per day.  He exercises with enough effort to increase his heart rate 3 or less days per week.   He is taking medications regularly.       Hypertension Follow-up      Do you check your blood pressure regularly outside of the clinic? Yes - occasionally    Are you following a low salt diet? Yes    Are your blood pressures ever more than 140 on the top number (systolic) OR more   than 90 on the bottom number (diastolic), for example 140/90? Yes - diastolic often in the 90's    Denies chest pain, dyspnea, HA, BOV, dizziness or urinary changes.   Meds as below.  Patient said does not have a lot of energy at end of the day for the duration of this winter. He describes it as just feels need to just sit in the couch after he gets home.  Works as a  - walks around a lot, on feet all day, not described as strenuous.      Review of Systems   Constitutional, HEENT, cardiovascular, pulmonary, GI, , musculoskeletal, neuro, skin, endocrine and psych systems are negative, except as otherwise noted.      Objective    BP (!) 156/100   Pulse 94   Temp 98.2  F (36.8  C) (Tympanic)   Resp 12   Ht 1.797 m (5' 10.75\")   Wt 110.5 kg (243 lb 9.6 oz)   SpO2 99%   BMI 34.22 kg/m    Body mass index is 34.22 kg/m .  Physical Exam   GENERAL:  alert and no distress, ambulatory w/o assist  NECK: no tenderness, no adenopathy,  Thyroid not enlarged  RESP: lungs clear to auscultation - no rales, no rhonchi, no wheezes  CV: regular rates and rhythm, no murmur  MS: no edema  SKIN: no suspicious lesions, no rashes  NEURO: strength and tone- normal, sensory exam- grossly normal, mentation- intact, speech- normal, reflexes- symmetric  ABD:  nontender    No results " found for any visits on 03/15/23.

## 2023-03-15 NOTE — PATIENT INSTRUCTIONS
Increase amlodipine to 10 mg daily.  Continue all other medications unchanged.    Be consistent with low salt, low trans fat and low saturated fat diet.  Eat food rich in omega-3-fatty acids as you tolerate. (salmon, olive oil)  Eat 5 cups of vegetables, fruits and whole grains per day.  Limit starchy food (white rice, white bread, white pasta, white potatoes) to less than a cup per meal.  Minimize sweets, junk food and fastfood. Limit soda beverages to one serving per day; best to avoid it altogether though.    Exercise: moderate intensity sustained for at least 30 mins per episode, goal of 150 mins per week at least  Combine cardiovascular and resistance exercises.  These exercise recommendations are in addition to your daily activity at work or home.  Work on losing weight.    Measure resting blood pressure at home at least once a day, and as needed if you have dizziness or other symptoms.  You may log measurements in a small notebook.    Goal blood pressure is less than 140/90    Contact the care team if your blood pressures are frequently out of range or if you have any concerning symptoms.

## 2023-03-29 ENCOUNTER — TELEPHONE (OUTPATIENT)
Dept: FAMILY MEDICINE | Facility: CLINIC | Age: 58
End: 2023-03-29

## 2023-03-29 ENCOUNTER — ALLIED HEALTH/NURSE VISIT (OUTPATIENT)
Dept: FAMILY MEDICINE | Facility: CLINIC | Age: 58
End: 2023-03-29
Payer: COMMERCIAL

## 2023-03-29 VITALS — HEART RATE: 96 BPM | DIASTOLIC BLOOD PRESSURE: 88 MMHG | SYSTOLIC BLOOD PRESSURE: 137 MMHG

## 2023-03-29 DIAGNOSIS — I10 UNCONTROLLED HYPERTENSION: Primary | ICD-10-CM

## 2023-03-29 PROCEDURE — 99207 PR NO CHARGE NURSE ONLY: CPT

## 2023-03-29 NOTE — PROGRESS NOTES
Nicholas Martinez is a 58 year old year old patient who comes in today for a Blood Pressure check because of ongoing blood pressure monitoring.    Pt was seen by Dr Cintron on 3/15/23 and amlodipine was increased to 10 mg daily.    Vital Signs as repeated by RN:  137/88, pulse 96  Rechecked to confirm 136/88, pulse 96.    Pt brought home monitor for comparison and it read 145/94, pulse 99.  Within 10 points and appears to read accurately.    Patient is taking medication as prescribed  Patient is tolerating medications well.    Patient is monitoring Blood Pressure at home.  Pt has checked twice since being seen and home readings are 130/85, pulse 85 and 136/90, pulse 86.    Current complaints: pt has noticed bilateral low back pain since increasing amlodipine to 10 mg daily.  Unsure if related.  Denies new or unusual activity.  Pt works as a  with lots of bending, twisting, and lifting but denies any activity that has been out of the ordinary.    Denies any changes in urination.  No increased urination.  Denies dark urine.    Denies swelling of extremities.    Disposition:  Routed to provider for further instructions.    Pharmacy is [facility] in [place].    Tasha Sharma RN

## 2023-03-29 NOTE — TELEPHONE ENCOUNTER
Nicholas Martinez is a 58 year old year old patient who comes in today for a Blood Pressure check because of ongoing blood pressure monitoring.     Pt was seen by Dr Cintron on 3/15/23 and amlodipine was increased to 10 mg daily.     Vital Signs as repeated by RN:  137/88, pulse 96  Rechecked to confirm: 136/88, pulse 96.     Pt brought home monitor for comparison and it read 145/94, pulse 99.  Within 10 points and appears to read accurately.     Patient is taking medication as prescribed  Patient is tolerating medications well.    Patient is monitoring Blood Pressure at home.  Pt has checked twice since being seen and home readings are 130/85, pulse 85 and 136/90, pulse 86.     Current complaints: pt has noticed bilateral low back pain since increasing amlodipine to 10 mg daily.  Unsure if related.  Denies new or unusual activity.  Pt works as a  with lots of bending, twisting, and lifting but denies any activity that has been out of the ordinary.     Denies any changes in urination.  No increased urination.  Denies dark urine.     Denies swelling of extremities.     Disposition:  Routed to provider for further instructions.     Pharmacy is [facility] in [place].     Tasha Sharma RN

## 2023-03-29 NOTE — TELEPHONE ENCOUNTER
BP is within goal. Patient may continue current med dose.  Reinforce sodium restriction.  Medications have refills.  Patient may have BP recheck next scheduled routine clinic visit.

## 2023-04-09 ENCOUNTER — HEALTH MAINTENANCE LETTER (OUTPATIENT)
Age: 58
End: 2023-04-09

## 2023-05-22 DIAGNOSIS — I10 ESSENTIAL HYPERTENSION WITH GOAL BLOOD PRESSURE LESS THAN 130/80: ICD-10-CM

## 2023-05-22 RX ORDER — TRIAMTERENE AND HYDROCHLOROTHIAZIDE 75; 50 MG/1; MG/1
1 TABLET ORAL DAILY
Qty: 90 TABLET | Refills: 1 | OUTPATIENT
Start: 2023-05-22

## 2023-10-29 DIAGNOSIS — I10 UNCONTROLLED HYPERTENSION: ICD-10-CM

## 2023-10-30 RX ORDER — LISINOPRIL 40 MG/1
40 TABLET ORAL DAILY
Qty: 90 TABLET | Refills: 0 | Status: SHIPPED | OUTPATIENT
Start: 2023-10-30 | End: 2024-01-10

## 2023-12-10 DIAGNOSIS — I10 ESSENTIAL HYPERTENSION WITH GOAL BLOOD PRESSURE LESS THAN 130/80: ICD-10-CM

## 2023-12-11 RX ORDER — TRIAMTERENE AND HYDROCHLOROTHIAZIDE 75; 50 MG/1; MG/1
1 TABLET ORAL DAILY
Qty: 30 TABLET | Refills: 0 | Status: SHIPPED | OUTPATIENT
Start: 2023-12-11 | End: 2024-01-10

## 2024-01-10 DIAGNOSIS — Z13.220 SCREENING FOR HYPERLIPIDEMIA: Primary | ICD-10-CM

## 2024-01-10 DIAGNOSIS — I10 UNCONTROLLED HYPERTENSION: ICD-10-CM

## 2024-01-10 DIAGNOSIS — I10 ESSENTIAL HYPERTENSION WITH GOAL BLOOD PRESSURE LESS THAN 130/80: ICD-10-CM

## 2024-01-10 RX ORDER — LISINOPRIL 40 MG/1
40 TABLET ORAL DAILY
Qty: 90 TABLET | Refills: 0 | Status: SHIPPED | OUTPATIENT
Start: 2024-01-10 | End: 2024-02-26

## 2024-01-10 RX ORDER — AMLODIPINE BESYLATE 10 MG/1
10 TABLET ORAL DAILY
Qty: 90 TABLET | Refills: 3 | Status: SHIPPED | OUTPATIENT
Start: 2024-01-10

## 2024-01-10 RX ORDER — TRIAMTERENE AND HYDROCHLOROTHIAZIDE 75; 50 MG/1; MG/1
1 TABLET ORAL DAILY
Qty: 30 TABLET | Refills: 0 | Status: SHIPPED | OUTPATIENT
Start: 2024-01-10 | End: 2024-02-09

## 2024-01-10 NOTE — TELEPHONE ENCOUNTER
Has appointment scheduled for 2/26/24      Requested Prescriptions   Pending Prescriptions Disp Refills    lisinopril (ZESTRIL) 40 MG tablet 90 tablet 0     Sig: Take 1 tablet (40 mg) by mouth daily SCHEDULE LAB APPOINTMENT, THEN AN IN-CLINIC PROVIDER APPOINTMENT.       ACE Inhibitors (Including Combos) Protocol Failed - 1/10/2024 11:27 AM        Failed - Normal serum creatinine on file in past 12 months     Recent Labs   Lab Test 11/16/22  1637   CR 1.34*       Ok to refill medication if creatinine is low          Failed - Normal serum potassium on file in past 12 months     Recent Labs   Lab Test 11/16/22  1637   POTASSIUM 3.9             Passed - Blood pressure under 140/90 in past 12 months     BP Readings from Last 3 Encounters:   03/29/23 137/88   03/15/23 (!) 156/100   01/20/23 112/77                 Passed - Recent (12 mo) or future (30 days) visit within the authorizing provider's specialty     The patient must have completed an in-person or virtual visit within the past 12 months or has a future visit scheduled within the next 90 days with the authorizing provider s specialty.  Urgent care and e-visits do not quality as an office visit for this protocol.          Passed - Medication is active on med list        Passed - Patient is age 18 or older          triamterene-HCTZ (MAXZIDE) 75-50 MG tablet 30 tablet 0     Sig: Take 1 tablet by mouth daily SCHEDULE FASTING LAB APPOINTMENT, THEN AN IN-CLINIC PROVIDER APPOINTMENT ASAP       Diuretics (Including Combos) Protocol Failed - 1/10/2024 11:27 AM        Failed - Normal serum creatinine on file in past 12 months     Recent Labs   Lab Test 11/16/22  1637   CR 1.34*              Failed - Normal serum potassium on file in past 12 months     Recent Labs   Lab Test 11/16/22  1637   POTASSIUM 3.9                    Failed - Normal serum sodium on file in past 12 months     Recent Labs   Lab Test 11/16/22  1637                 Passed - Blood pressure under  140/90 in past 12 months     BP Readings from Last 3 Encounters:   03/29/23 137/88   03/15/23 (!) 156/100   01/20/23 112/77                 Passed - Recent (12 mo) or future (30 days) visit within the authorizing provider's specialty     The patient must have completed an in-person or virtual visit within the past 12 months or has a future visit scheduled within the next 90 days with the authorizing provider s specialty.  Urgent care and e-visits do not quality as an office visit for this protocol.          Passed - Medication is active on med list        Passed - Patient is age 18 or older          amLODIPine (NORVASC) 10 MG tablet 90 tablet 3     Sig: Take 1 tablet (10 mg) by mouth daily       Calcium Channel Blockers Protocol  Failed - 1/10/2024 11:27 AM        Failed - Normal serum creatinine on file in past 12 months     Recent Labs   Lab Test 11/16/22  1637   CR 1.34*       Ok to refill medication if creatinine is low          Passed - Blood pressure under 140/90 in past 12 months     BP Readings from Last 3 Encounters:   03/29/23 137/88   03/15/23 (!) 156/100   01/20/23 112/77                 Passed - Recent (12 mo) or future (30 days) visit within the authorizing provider's specialty     The patient must have completed an in-person or virtual visit within the past 12 months or has a future visit scheduled within the next 90 days with the authorizing provider s specialty.  Urgent care and e-visits do not quality as an office visit for this protocol.          Passed - Medication is active on med list        Passed - Patient is age 18 or older

## 2024-01-10 NOTE — TELEPHONE ENCOUNTER
Medication Question or Refill        What medication are you calling about (include dose and sig)?: Pending Prescriptions:                       Disp   Refills    lisinopril (ZESTRIL) 40 MG tablet         90 tab*0            Sig: Take 1 tablet (40 mg) by mouth daily SCHEDULE LAB           APPOINTMENT, THEN AN IN-CLINIC PROVIDER           APPOINTMENT.    triamterene-HCTZ (MAXZIDE) 75-50 MG [id number] tab*0            Sig: Take 1 tablet by mouth daily SCHEDULE FASTING LAB           APPOINTMENT, THEN AN IN-CLINIC PROVIDER           APPOINTMENT ASAP    amLODIPine (NORVASC) 10 MG tablet         90 tab*3            Sig: Take 1 tablet (10 mg) by mouth daily            Preferred Pharmacy:       [id number] - [place] [id number] [facility] [address] [place] [address]  [address] [facility] [address]  Phone: [phone] Fax: [phone]      Controlled Substance Agreement on file:   CSA -- Patient Level:    CSA: None found at the patient level.       Who prescribed the medication?: Cintron    Do you need a refill? Yes      Patient offered an appointment? No    Do you have any questions or concerns?  Yes: pt has PCP appt scheduled for Feb 26, pt requesting fill to get to appt      Could we send this information to you in [facility] or would you prefer to receive a phone call?:   Patient would prefer a phone call   Okay to leave a detailed message?: Yes at Home number on file [phone] (home)

## 2024-02-08 DIAGNOSIS — I10 ESSENTIAL HYPERTENSION WITH GOAL BLOOD PRESSURE LESS THAN 130/80: ICD-10-CM

## 2024-02-08 NOTE — TELEPHONE ENCOUNTER
Requested Prescriptions   Pending Prescriptions Disp Refills    triamterene-HCTZ (MAXZIDE) 75-50 MG tablet [Pharmacy Med Name: TRIAMTERENE 75MG/ HCTZ 50MG TABLETS] 30 tablet 0     Sig: TAKE 1 TABLET BY MOUTH DAILY       Diuretics (Including Combos) Protocol Failed - 2/8/2024  3:44 AM        Failed - Has GFR on file in past 12 months and most recent value is normal        Passed - Blood pressure under 140/90 in past 12 months     BP Readings from Last 3 Encounters:   03/29/23 137/88   03/15/23 (!) 156/100   01/20/23 112/77                 Passed - Recent (12 mo) or future (30 days) visit within the authorizing provider's specialty     The patient must have completed an in-person or virtual visit within the past 12 months or has a future visit scheduled within the next 90 days with the authorizing provider s specialty.  Urgent care and e-visits do not quality as an office visit for this protocol.          Passed - Medication is active on med list        Passed - Medication indicated for associated diagnosis     Medication is associated with one or more of the following diagnoses:     Edema   Hypertension   Heart Failure   Meniere's Disease          Passed - Patient is age 18 or older          Last Written Prescription Date:  1/10/24  Last Fill Quantity: 30,  # refills: 0   Last office visit: 3/15/2023 ; last virtual visit: Visit date not found with prescribing provider:     Future Office Visit:   Next 5 appointments (look out 90 days)      Feb 26, 2024  2:30 PM  (Arrive by 2:10 PM)  Provider Visit with Dejuan Cintron MD  [facility] ([facility] )  Arrive at: Clinic A [address] [place] [id number]  [phone]           Julie Behrendt RN

## 2024-02-09 RX ORDER — TRIAMTERENE AND HYDROCHLOROTHIAZIDE 75; 50 MG/1; MG/1
1 TABLET ORAL DAILY
Qty: 30 TABLET | Refills: 0 | Status: SHIPPED | OUTPATIENT
Start: 2024-02-09 | End: 2024-02-26

## 2024-02-26 ENCOUNTER — OFFICE VISIT (OUTPATIENT)
Dept: FAMILY MEDICINE | Facility: CLINIC | Age: 59
End: 2024-02-26
Payer: COMMERCIAL

## 2024-02-26 VITALS
BODY MASS INDEX: 34.02 KG/M2 | SYSTOLIC BLOOD PRESSURE: 138 MMHG | RESPIRATION RATE: 16 BRPM | HEIGHT: 71 IN | OXYGEN SATURATION: 94 % | WEIGHT: 243 LBS | DIASTOLIC BLOOD PRESSURE: 82 MMHG | TEMPERATURE: 98.5 F | HEART RATE: 99 BPM

## 2024-02-26 DIAGNOSIS — N18.2 CKD (CHRONIC KIDNEY DISEASE) STAGE 2, GFR 60-89 ML/MIN: ICD-10-CM

## 2024-02-26 DIAGNOSIS — I10 ESSENTIAL HYPERTENSION WITH GOAL BLOOD PRESSURE LESS THAN 130/80: Primary | ICD-10-CM

## 2024-02-26 LAB
ANION GAP SERPL CALCULATED.3IONS-SCNC: 14 MMOL/L (ref 7–15)
BUN SERPL-MCNC: 24.3 MG/DL (ref 8–23)
CALCIUM SERPL-MCNC: 9.4 MG/DL (ref 8.6–10)
CHLORIDE SERPL-SCNC: 103 MMOL/L (ref 98–107)
CREAT SERPL-MCNC: 1.55 MG/DL (ref 0.67–1.17)
CREAT UR-MCNC: 109.7 MG/DL
DEPRECATED HCO3 PLAS-SCNC: 25 MMOL/L (ref 22–29)
EGFRCR SERPLBLD CKD-EPI 2021: 51 ML/MIN/1.73M2
GLUCOSE SERPL-MCNC: 96 MG/DL (ref 70–99)
MICROALBUMIN UR-MCNC: <12 MG/L
MICROALBUMIN/CREAT UR: NORMAL MG/G{CREAT}
POTASSIUM SERPL-SCNC: 3.7 MMOL/L (ref 3.4–5.3)
SODIUM SERPL-SCNC: 142 MMOL/L (ref 135–145)

## 2024-02-26 PROCEDURE — 90750 HZV VACC RECOMBINANT IM: CPT | Performed by: FAMILY MEDICINE

## 2024-02-26 PROCEDURE — 80048 BASIC METABOLIC PNL TOTAL CA: CPT | Performed by: FAMILY MEDICINE

## 2024-02-26 PROCEDURE — 82043 UR ALBUMIN QUANTITATIVE: CPT | Performed by: FAMILY MEDICINE

## 2024-02-26 PROCEDURE — 99214 OFFICE O/P EST MOD 30 MIN: CPT | Mod: 25 | Performed by: FAMILY MEDICINE

## 2024-02-26 PROCEDURE — 36415 COLL VENOUS BLD VENIPUNCTURE: CPT | Performed by: FAMILY MEDICINE

## 2024-02-26 PROCEDURE — 82570 ASSAY OF URINE CREATININE: CPT | Performed by: FAMILY MEDICINE

## 2024-02-26 PROCEDURE — 90472 IMMUNIZATION ADMIN EACH ADD: CPT | Performed by: FAMILY MEDICINE

## 2024-02-26 PROCEDURE — 90471 IMMUNIZATION ADMIN: CPT | Performed by: FAMILY MEDICINE

## 2024-02-26 PROCEDURE — 90686 IIV4 VACC NO PRSV 0.5 ML IM: CPT | Performed by: FAMILY MEDICINE

## 2024-02-26 RX ORDER — TRIAMTERENE AND HYDROCHLOROTHIAZIDE 75; 50 MG/1; MG/1
1 TABLET ORAL DAILY
Qty: 90 TABLET | Refills: 3 | Status: SHIPPED | OUTPATIENT
Start: 2024-02-26

## 2024-02-26 RX ORDER — LISINOPRIL 40 MG/1
40 TABLET ORAL DAILY
Qty: 90 TABLET | Refills: 3 | Status: SHIPPED | OUTPATIENT
Start: 2024-02-26

## 2024-02-26 ASSESSMENT — PAIN SCALES - GENERAL: PAINLEVEL: NO PAIN (0)

## 2024-02-26 NOTE — NURSING NOTE
Prior to immunization administration, verified patients identity using patient s name and date of birth. Please see Immunization Activity for additional information.     Screening Questionnaire for Adult Immunization    Are you sick today?   No   Do you have allergies to medications, food, a vaccine component or latex?   No   Have you ever had a serious reaction after receiving a vaccination?   No   Do you have a long-term health problem with heart, lung, kidney, or metabolic disease (e.g., diabetes), asthma, a blood disorder, no spleen, complement component deficiency, a cochlear implant, or a spinal fluid leak?  Are you on long-term aspirin therapy?   No   Do you have cancer, leukemia, HIV/AIDS, or any other immune system problem?   No   Do you have a parent, brother, or sister with an immune system problem?   No   In the past 3 months, have you taken medications that affect  your immune system, such as prednisone, other steroids, or anticancer drugs; drugs for the treatment of rheumatoid arthritis, Crohn s disease, or psoriasis; or have you had radiation treatments?   No   Have you had a seizure, or a brain or other nervous system problem?   No   During the past year, have you received a transfusion of blood or blood    products, or been given immune (gamma) globulin or antiviral drug?   No   For women: Are you pregnant or is there a chance you could become       pregnant during the next month?   No   Have you received any vaccinations in the past 4 weeks?   No     Immunization questionnaire answers were all negative.      Patient instructed to remain in clinic for 15 minutes afterwards, and to report any adverse reactions.     Screening performed by Emily Rivas MA on 2/26/2024 at 2:56 PM.

## 2024-02-26 NOTE — PROGRESS NOTES
"  Assessment & Plan     Essential hypertension with goal blood pressure less than 130/80  Controlled.  Low salt, low fat diet.   Exercise as tolerated.  Take meds as prescribed; call if with side effects.    - triamterene-HCTZ (MAXZIDE) 75-50 MG tablet  Dispense: 90 tablet; Refill: 3  - lisinopril (ZESTRIL) 40 MG tablet  Dispense: 90 tablet; Refill: 3    CKD (chronic kidney disease) stage 2, GFR 60-89 ml/min  Stable renal function on recent labs.  Maintain hydration. Avoid nephrotoxins.   - Albumin Random Urine Quantitative with Creat Ratio  - BASIC METABOLIC PANEL      BMI  Estimated body mass index is 33.89 kg/m  as calculated from the following:    Height as of this encounter: 1.803 m (5' 11\").    Weight as of this encounter: 110.2 kg (243 lb).   Weight management plan: Discussed healthy diet and exercise guidelines      Patient Instructions   Plan to get hepatitis B immunization series if you have not received it or unsure if you have completed it before.      Complete your updated covid19 and immunizations ASAP if you deferred it today.    Be consistent with low salt, low trans fat and low saturated fat diet.  Eat food rich in omega-3-fatty acids as you tolerate. (salmon, olive oil)  Eat 5 cups of vegetables, fruits and whole grains per day.  Limit starchy food (white rice, white bread, white pasta, white potatoes) to less than a cup per meal.  Minimize sweets, junk food and fastfood. Limit soda beverages to one serving per day; best to avoid it altogether though.    Exercise: moderate intensity sustained for at least 30 mins per episode, goal of 150 mins per week at least  Combine cardiovascular and resistance exercises.  These exercise recommendations are in addition to your daily activity at work or home.  Work on losing weight.    You will be contacted in 1-2 days for results of your lab tests.     Slade Peterson is a 59 year old, presenting for the following health issues:  Recheck Medication and " "Hypertension        2/26/2024     1:59 PM   Additional Questions   Roomed by Emily SEWELL MA   Accompanied by Self         2/26/2024     1:59 PM   Patient Reported Additional Medications   Patient reports taking the following new medications none     History of Present Illness       Hypertension: He presents for follow up of hypertension.  He does not check blood pressure  regularly outside of the clinic. Outside blood pressures have been over 140/90. He follows a low salt diet.     He eats 0-1 servings of fruits and vegetables daily.He consumes 3 sweetened beverage(s) daily.He exercises with enough effort to increase his heart rate 9 or less minutes per day.  He exercises with enough effort to increase his heart rate 7 days per week. He is missing 1 dose(s) of medications per week.     Patient denies ADR to meds.          Review of Systems  Constitutional, neuro, ENT, endocrine, pulmonary, cardiac, gastrointestinal, genitourinary, musculoskeletal, integument and psychiatric systems are negative, except as otherwise noted.      Objective    /82 (BP Location: Right arm, Patient Position: Sitting, Cuff Size: Adult Large)   Pulse 99   Temp 98.5  F (36.9  C) (Tympanic)   Resp 16   Ht 1.803 m (5' 11\")   Wt 110.2 kg (243 lb)   SpO2 94%   BMI 33.89 kg/m    Body mass index is 33.89 kg/m .  Physical Exam   GENERAL: obese,  alert and no distress, ambulatory w/o assist  NECK: no tenderness, no adenopathy,  Thyroid not enlarged  RESP: lungs clear to auscultation - no rales, no rhonchi, no wheezes  CV: regular rates and rhythm, no murmur  MS: no edema  SKIN: no suspicious lesions, no rashes  NEURO: strength and tone- normal, sensory exam- grossly normal, mentation- intact, speech- normal, reflexes- symmetric  ABD:  nontender     No results found for any visits on 02/26/24.        Signed Electronically by: Dejuan Cintron MD    "

## 2024-02-26 NOTE — PATIENT INSTRUCTIONS
Plan to get hepatitis B immunization series if you have not received it or unsure if you have completed it before.      Complete your updated covid19 and immunizations ASAP if you deferred it today.    Be consistent with low salt, low trans fat and low saturated fat diet.  Eat food rich in omega-3-fatty acids as you tolerate. (salmon, olive oil)  Eat 5 cups of vegetables, fruits and whole grains per day.  Limit starchy food (white rice, white bread, white pasta, white potatoes) to less than a cup per meal.  Minimize sweets, junk food and fastfood. Limit soda beverages to one serving per day; best to avoid it altogether though.    Exercise: moderate intensity sustained for at least 30 mins per episode, goal of 150 mins per week at least  Combine cardiovascular and resistance exercises.  These exercise recommendations are in addition to your daily activity at work or home.  Work on losing weight.    You will be contacted in 1-2 days for results of your lab tests.

## 2024-05-07 ENCOUNTER — LAB (OUTPATIENT)
Dept: LAB | Facility: CLINIC | Age: 59
End: 2024-05-07
Payer: COMMERCIAL

## 2024-05-07 DIAGNOSIS — N18.2 CKD (CHRONIC KIDNEY DISEASE) STAGE 2, GFR 60-89 ML/MIN: ICD-10-CM

## 2024-05-07 DIAGNOSIS — Z13.220 SCREENING FOR HYPERLIPIDEMIA: ICD-10-CM

## 2024-05-07 LAB
CHOLEST SERPL-MCNC: 174 MG/DL
CREAT SERPL-MCNC: 1.31 MG/DL (ref 0.67–1.17)
EGFRCR SERPLBLD CKD-EPI 2021: 63 ML/MIN/1.73M2
FASTING STATUS PATIENT QL REPORTED: YES
HDLC SERPL-MCNC: 35 MG/DL
HGB BLD-MCNC: 15.5 G/DL (ref 13.3–17.7)
LDLC SERPL CALC-MCNC: 110 MG/DL
NONHDLC SERPL-MCNC: 139 MG/DL
TRIGL SERPL-MCNC: 143 MG/DL

## 2024-05-07 PROCEDURE — 36415 COLL VENOUS BLD VENIPUNCTURE: CPT

## 2024-05-07 PROCEDURE — 80061 LIPID PANEL: CPT

## 2024-05-07 PROCEDURE — 82565 ASSAY OF CREATININE: CPT

## 2024-05-07 PROCEDURE — 85018 HEMOGLOBIN: CPT

## 2024-06-16 ENCOUNTER — HEALTH MAINTENANCE LETTER (OUTPATIENT)
Age: 59
End: 2024-06-16

## 2025-04-01 DIAGNOSIS — I10 UNCONTROLLED HYPERTENSION: ICD-10-CM

## 2025-04-01 DIAGNOSIS — I10 ESSENTIAL HYPERTENSION WITH GOAL BLOOD PRESSURE LESS THAN 130/80: ICD-10-CM

## 2025-04-01 RX ORDER — AMLODIPINE BESYLATE 10 MG/1
10 TABLET ORAL DAILY
Qty: 30 TABLET | Refills: 0 | Status: SHIPPED | OUTPATIENT
Start: 2025-04-01

## 2025-04-01 RX ORDER — TRIAMTERENE AND HYDROCHLOROTHIAZIDE 75; 50 MG/1; MG/1
1 TABLET ORAL DAILY
Qty: 30 TABLET | Refills: 0 | Status: SHIPPED | OUTPATIENT
Start: 2025-04-01

## 2025-04-29 ENCOUNTER — MYC REFILL (OUTPATIENT)
Dept: FAMILY MEDICINE | Facility: CLINIC | Age: 60
End: 2025-04-29
Payer: COMMERCIAL

## 2025-04-29 DIAGNOSIS — I10 UNCONTROLLED HYPERTENSION: ICD-10-CM

## 2025-04-29 DIAGNOSIS — I10 ESSENTIAL HYPERTENSION WITH GOAL BLOOD PRESSURE LESS THAN 130/80: ICD-10-CM

## 2025-04-29 RX ORDER — TRIAMTERENE AND HYDROCHLOROTHIAZIDE 75; 50 MG/1; MG/1
1 TABLET ORAL DAILY
Qty: 30 TABLET | Refills: 0 | Status: SHIPPED | OUTPATIENT
Start: 2025-04-29

## 2025-04-29 RX ORDER — AMLODIPINE BESYLATE 10 MG/1
10 TABLET ORAL DAILY
Qty: 30 TABLET | Refills: 0 | Status: SHIPPED | OUTPATIENT
Start: 2025-04-29

## 2025-04-29 RX ORDER — LISINOPRIL 40 MG/1
40 TABLET ORAL DAILY
Qty: 30 TABLET | Refills: 0 | Status: SHIPPED | OUTPATIENT
Start: 2025-04-29

## 2025-05-20 ENCOUNTER — OFFICE VISIT (OUTPATIENT)
Dept: FAMILY MEDICINE | Facility: CLINIC | Age: 60
End: 2025-05-20
Payer: COMMERCIAL

## 2025-05-20 ENCOUNTER — RESULTS FOLLOW-UP (OUTPATIENT)
Dept: FAMILY MEDICINE | Facility: CLINIC | Age: 60
End: 2025-05-20

## 2025-05-20 VITALS
RESPIRATION RATE: 20 BRPM | WEIGHT: 245 LBS | BODY MASS INDEX: 33.18 KG/M2 | OXYGEN SATURATION: 97 % | HEART RATE: 100 BPM | DIASTOLIC BLOOD PRESSURE: 90 MMHG | SYSTOLIC BLOOD PRESSURE: 160 MMHG | HEIGHT: 72 IN | TEMPERATURE: 97.5 F

## 2025-05-20 DIAGNOSIS — N18.2 CKD (CHRONIC KIDNEY DISEASE) STAGE 2, GFR 60-89 ML/MIN: ICD-10-CM

## 2025-05-20 DIAGNOSIS — I10 UNCONTROLLED HYPERTENSION: Primary | ICD-10-CM

## 2025-05-20 DIAGNOSIS — R53.83 FATIGUE, UNSPECIFIED TYPE: ICD-10-CM

## 2025-05-20 DIAGNOSIS — Z13.6 SCREENING FOR CARDIOVASCULAR CONDITION: ICD-10-CM

## 2025-05-20 LAB
ALBUMIN SERPL BCG-MCNC: 4.5 G/DL (ref 3.5–5.2)
ALBUMIN UR-MCNC: NEGATIVE MG/DL
ALP SERPL-CCNC: 118 U/L (ref 40–150)
ALT SERPL W P-5'-P-CCNC: 20 U/L (ref 0–70)
ANION GAP SERPL CALCULATED.3IONS-SCNC: 12 MMOL/L (ref 7–15)
APPEARANCE UR: CLEAR
AST SERPL W P-5'-P-CCNC: 23 U/L (ref 0–45)
BASOPHILS # BLD AUTO: 0.1 10E3/UL (ref 0–0.2)
BASOPHILS NFR BLD AUTO: 1 %
BILIRUB SERPL-MCNC: 0.9 MG/DL
BILIRUB UR QL STRIP: NEGATIVE
BUN SERPL-MCNC: 19.9 MG/DL (ref 8–23)
CALCIUM SERPL-MCNC: 9.8 MG/DL (ref 8.8–10.4)
CHLORIDE SERPL-SCNC: 97 MMOL/L (ref 98–107)
CHOLEST SERPL-MCNC: 199 MG/DL
COLOR UR AUTO: YELLOW
CREAT SERPL-MCNC: 1.35 MG/DL (ref 0.67–1.17)
CREAT UR-MCNC: 77.8 MG/DL
EGFRCR SERPLBLD CKD-EPI 2021: 60 ML/MIN/1.73M2
EOSINOPHIL # BLD AUTO: 0.5 10E3/UL (ref 0–0.7)
EOSINOPHIL NFR BLD AUTO: 9 %
ERYTHROCYTE [DISTWIDTH] IN BLOOD BY AUTOMATED COUNT: 12.3 % (ref 10–15)
FASTING STATUS PATIENT QL REPORTED: YES
FASTING STATUS PATIENT QL REPORTED: YES
GLUCOSE SERPL-MCNC: 97 MG/DL (ref 70–99)
GLUCOSE UR STRIP-MCNC: NEGATIVE MG/DL
HCO3 SERPL-SCNC: 31 MMOL/L (ref 22–29)
HCT VFR BLD AUTO: 51.4 % (ref 40–53)
HDLC SERPL-MCNC: 37 MG/DL
HGB BLD-MCNC: 17.3 G/DL (ref 13.3–17.7)
HGB UR QL STRIP: NEGATIVE
IMM GRANULOCYTES # BLD: 0 10E3/UL
IMM GRANULOCYTES NFR BLD: 0 %
KETONES UR STRIP-MCNC: NEGATIVE MG/DL
LDLC SERPL CALC-MCNC: 136 MG/DL
LEUKOCYTE ESTERASE UR QL STRIP: NEGATIVE
LYMPHOCYTES # BLD AUTO: 2.8 10E3/UL (ref 0.8–5.3)
LYMPHOCYTES NFR BLD AUTO: 48 %
MCH RBC QN AUTO: 28.4 PG (ref 26.5–33)
MCHC RBC AUTO-ENTMCNC: 33.7 G/DL (ref 31.5–36.5)
MCV RBC AUTO: 84 FL (ref 78–100)
MICROALBUMIN UR-MCNC: <12 MG/L
MICROALBUMIN/CREAT UR: NORMAL MG/G{CREAT}
MONOCYTES # BLD AUTO: 0.6 10E3/UL (ref 0–1.3)
MONOCYTES NFR BLD AUTO: 10 %
NEUTROPHILS # BLD AUTO: 1.9 10E3/UL (ref 1.6–8.3)
NEUTROPHILS NFR BLD AUTO: 32 %
NITRATE UR QL: NEGATIVE
NONHDLC SERPL-MCNC: 162 MG/DL
PH UR STRIP: 8 [PH] (ref 5–7)
PLATELET # BLD AUTO: 314 10E3/UL (ref 150–450)
POTASSIUM SERPL-SCNC: 3.4 MMOL/L (ref 3.4–5.3)
PROT SERPL-MCNC: 7.3 G/DL (ref 6.4–8.3)
RBC # BLD AUTO: 6.1 10E6/UL (ref 4.4–5.9)
SODIUM SERPL-SCNC: 140 MMOL/L (ref 135–145)
SP GR UR STRIP: 1.01 (ref 1–1.03)
TRIGL SERPL-MCNC: 132 MG/DL
TSH SERPL DL<=0.005 MIU/L-ACNC: 2.52 UIU/ML (ref 0.3–4.2)
UROBILINOGEN UR STRIP-ACNC: 0.2 E.U./DL
WBC # BLD AUTO: 5.8 10E3/UL (ref 4–11)

## 2025-05-20 PROCEDURE — G2211 COMPLEX E/M VISIT ADD ON: HCPCS | Performed by: FAMILY MEDICINE

## 2025-05-20 PROCEDURE — 99214 OFFICE O/P EST MOD 30 MIN: CPT | Mod: 25 | Performed by: FAMILY MEDICINE

## 2025-05-20 PROCEDURE — 3077F SYST BP >= 140 MM HG: CPT | Performed by: FAMILY MEDICINE

## 2025-05-20 PROCEDURE — 80053 COMPREHEN METABOLIC PANEL: CPT | Performed by: FAMILY MEDICINE

## 2025-05-20 PROCEDURE — 3079F DIAST BP 80-89 MM HG: CPT | Performed by: FAMILY MEDICINE

## 2025-05-20 PROCEDURE — 90472 IMMUNIZATION ADMIN EACH ADD: CPT | Performed by: FAMILY MEDICINE

## 2025-05-20 PROCEDURE — 90750 HZV VACC RECOMBINANT IM: CPT | Performed by: FAMILY MEDICINE

## 2025-05-20 PROCEDURE — 82043 UR ALBUMIN QUANTITATIVE: CPT | Performed by: FAMILY MEDICINE

## 2025-05-20 PROCEDURE — 91320 SARSCV2 VAC 30MCG TRS-SUC IM: CPT | Performed by: FAMILY MEDICINE

## 2025-05-20 PROCEDURE — 82570 ASSAY OF URINE CREATININE: CPT | Performed by: FAMILY MEDICINE

## 2025-05-20 PROCEDURE — 81003 URINALYSIS AUTO W/O SCOPE: CPT | Performed by: FAMILY MEDICINE

## 2025-05-20 PROCEDURE — 84443 ASSAY THYROID STIM HORMONE: CPT | Performed by: FAMILY MEDICINE

## 2025-05-20 PROCEDURE — 90471 IMMUNIZATION ADMIN: CPT | Performed by: FAMILY MEDICINE

## 2025-05-20 PROCEDURE — 80061 LIPID PANEL: CPT | Performed by: FAMILY MEDICINE

## 2025-05-20 PROCEDURE — 36415 COLL VENOUS BLD VENIPUNCTURE: CPT | Performed by: FAMILY MEDICINE

## 2025-05-20 PROCEDURE — 90480 ADMN SARSCOV2 VAC 1/ONLY CMP: CPT | Performed by: FAMILY MEDICINE

## 2025-05-20 PROCEDURE — 1125F AMNT PAIN NOTED PAIN PRSNT: CPT | Performed by: FAMILY MEDICINE

## 2025-05-20 PROCEDURE — 90677 PCV20 VACCINE IM: CPT | Performed by: FAMILY MEDICINE

## 2025-05-20 PROCEDURE — 85025 COMPLETE CBC W/AUTO DIFF WBC: CPT | Performed by: FAMILY MEDICINE

## 2025-05-20 RX ORDER — AMLODIPINE BESYLATE 10 MG/1
10 TABLET ORAL DAILY
Qty: 90 TABLET | Refills: 3 | Status: SHIPPED | OUTPATIENT
Start: 2025-05-20

## 2025-05-20 RX ORDER — TRIAMTERENE AND HYDROCHLOROTHIAZIDE 75; 50 MG/1; MG/1
1 TABLET ORAL DAILY
Qty: 90 TABLET | Refills: 3 | Status: SHIPPED | OUTPATIENT
Start: 2025-05-20

## 2025-05-20 RX ORDER — LISINOPRIL 40 MG/1
40 TABLET ORAL DAILY
Qty: 90 TABLET | Refills: 3 | Status: SHIPPED | OUTPATIENT
Start: 2025-05-20

## 2025-05-20 ASSESSMENT — PAIN SCALES - GENERAL: PAINLEVEL_OUTOF10: MILD PAIN (1)

## 2025-05-20 NOTE — NURSING NOTE
Prior to immunization administration, verified patients identity using patient s name and date of birth. Please see Immunization Activity for additional information.     Screening Questionnaire for Adult Immunization    Are you sick today?   No   Do you have allergies to medications, food, a vaccine component or latex?   No   Have you ever had a serious reaction after receiving a vaccination?   No   Do you have a long-term health problem with heart, lung, kidney, or metabolic disease (e.g., diabetes), asthma, a blood disorder, no spleen, complement component deficiency, a cochlear implant, or a spinal fluid leak?  Are you on long-term aspirin therapy?   No   Do you have cancer, leukemia, HIV/AIDS, or any other immune system problem?   No   Do you have a parent, brother, or sister with an immune system problem?   No   In the past 3 months, have you taken medications that affect  your immune system, such as prednisone, other steroids, or anticancer drugs; drugs for the treatment of rheumatoid arthritis, Crohn s disease, or psoriasis; or have you had radiation treatments?   No   Have you had a seizure, or a brain or other nervous system problem?   No   During the past year, have you received a transfusion of blood or blood    products, or been given immune (gamma) globulin or antiviral drug?   No   For women: Are you pregnant or is there a chance you could become       pregnant during the next month?   No   Have you received any vaccinations in the past 4 weeks?   No     Immunization questionnaire answers were all negative.      Patient instructed to remain in clinic for 15 minutes afterwards, and to report any adverse reactions.     Screening performed by Emily Rivas MA on 5/20/2025 at 8:57 AM.

## 2025-05-20 NOTE — PATIENT INSTRUCTIONS
Patient Education     Take all medications as prescribed everyday - do not miss taking your meds to make sure blood pressure is managed well.    Be consistent with low salt, low trans fat and low saturated fat diet.  Eat food rich in omega-3-fatty acids as you tolerate. (salmon, olive oil)  Eat 5 cups of vegetables, fruits and whole grains per day.  Limit starchy food (white rice, white bread, white pasta, white potatoes) to less than a cup per meal.  Minimize sweets, junk food and fastfood. Limit soda beverages to one serving per day; best to avoid it altogether though.    Exercise: moderate intensity sustained for at least 30 mins per episode, goal of 150 mins per week at least  Combine cardiovascular and resistance exercises.  These exercise recommendations are in addition to your daily activity at work or home.  Work on losing weight.    Blood tests: You will be contacted in 1-2 days for results of your lab tests.   Further recommendations thereafter.   Preventive Care Advice   This is general advice given by our system to help you stay healthy. However, your care team may have specific advice just for you. Please talk to your care team about your preventive care needs.  Nutrition  Eat 5 or more servings of fruits and vegetables each day.  Try wheat bread, brown rice and whole grain pasta (instead of white bread, rice, and pasta).  Get enough calcium and vitamin D. Check the label on foods and aim for 100% of the RDA (recommended daily allowance).  Lifestyle  Exercise at least 150 minutes each week  (30 minutes a day, 5 days a week).  Do muscle strengthening activities 2 days a week. These help control your weight and prevent disease.  No smoking.  Wear sunscreen to prevent skin cancer.  Have a dental exam and cleaning every 6 months.  Yearly exams  See your health care team every year to talk about:  Any changes in your health.  Any medicines your care team has prescribed.  Preventive care, family planning, and  ways to prevent chronic diseases.  Shots (vaccines)   HPV shots (up to age 26), if you've never had them before.  Hepatitis B shots (up to age 59), if you've never had them before.  COVID-19 shot: Get this shot when it's due.  Flu shot: Get a flu shot every year.  Tetanus shot: Get a tetanus shot every 10 years.  Pneumococcal, hepatitis A, and RSV shots: Ask your care team if you need these based on your risk.  Shingles shot (for age 50 and up)  General health tests  Diabetes screening:  Starting at age 35, Get screened for diabetes at least every 3 years.  If you are younger than age 35, ask your care team if you should be screened for diabetes.  Cholesterol test: At age 39, start having a cholesterol test every 5 years, or more often if advised.  Bone density scan (DEXA): At age 50, ask your care team if you should have this scan for osteoporosis (brittle bones).  Hepatitis C: Get tested at least once in your life.  STIs (sexually transmitted infections)  Before age 24: Ask your care team if you should be screened for STIs.  After age 24: Get screened for STIs if you're at risk. You are at risk for STIs (including HIV) if:  You are sexually active with more than one person.  You don't use condoms every time.  You or a partner was diagnosed with a sexually transmitted infection.  If you are at risk for HIV, ask about PrEP medicine to prevent HIV.  Get tested for HIV at least once in your life, whether you are at risk for HIV or not.  Cancer screening tests  Cervical cancer screening: If you have a cervix, begin getting regular cervical cancer screening tests starting at age 21.  Breast cancer scan (mammogram): If you've ever had breasts, begin having regular mammograms starting at age 40. This is a scan to check for breast cancer.  Colon cancer screening: It is important to start screening for colon cancer at age 45.  Have a colonoscopy test every 10 years (or more often if you're at risk) Or, ask your provider  about stool tests like a FIT test every year or Cologuard test every 3 years.  To learn more about your testing options, visit:   .  For help making a decision, visit:   https://bit.ly/ff60773.  Prostate cancer screening test: If you have a prostate, ask your care team if a prostate cancer screening test (PSA) at age 55 is right for you.  Lung cancer screening: If you are a current or former smoker ages 50 to 80, ask your care team if ongoing lung cancer screenings are right for you.  For informational purposes only. Not to replace the advice of your health care provider. Copyright   2023 [facility]. All rights reserved. Clinically reviewed by the [facility]. [employer] [id number] - REV 01/24.

## 2025-05-20 NOTE — PROGRESS NOTES
"  Assessment & Plan     Uncontrolled hypertension  Advised patient on importance of adherence to medical therapy. Advised only way to know if meds are giving hi side effects is if he takes them regularly and then we can assess if any symptoms are from any of them.  Patient agreed to take all meds as prescribed. Refilled them today.  Repeat labs.  Reinforced sodium restriction.  Exercise recommendations reviewed with patient.  Recheck with RN in 2 weeks    - amLODIPine (NORVASC) 10 MG tablet  Dispense: 90 tablet; Refill: 3  - lisinopril (ZESTRIL) 40 MG tablet  Dispense: 90 tablet; Refill: 3  - triamterene-HCTZ (MAXZIDE) 75-50 MG tablet  Dispense: 90 tablet; Refill: 3  - TSH with free T4 reflex  - Comprehensive metabolic panel (BMP + Alb, Alk Phos, ALT, AST, Total. Bili, TP)    CKD (chronic kidney disease) stage 2, GFR 60-89 ml/min  Drink plenty of water everyday.   Avoid taking Ibuprofen or Naproxen (NSAIDs)   - Albumin Random Urine Quantitative with Creat Ratio  - Urine Macroscopic with reflex to Microscopic  - CBC with Platelets & Differential  - Comprehensive metabolic panel (BMP + Alb, Alk Phos, ALT, AST, Total. Bili, TP)    Fatigue, unspecified type  Vague description per patient.  Could be multifactorial: deconditioning (patient admits to be sedentary), undiagnosed new hypothyroidism, anemia, uncontrolled hypertension, worsened CKD, occult endocrinopathy  Initial work up as ordered. Treat if any abnormality detected.  - Urine Macroscopic with reflex to Microscopic  - TSH with free T4 reflex  - CBC with Platelets & Differential  - Comprehensive metabolic panel (BMP + Alb, Alk Phos, ALT, AST, Total. Bili, TP)    Screening for cardiovascular condition  - Lipid panel reflex to direct LDL Fasting        BMI  Estimated body mass index is 33.69 kg/m  as calculated from the following:    Height as of this encounter: 1.816 m (5' 11.5\").    Weight as of this encounter: 111.1 kg (245 lb).   Weight management plan: " "Discussed healthy diet and exercise guidelines      Follow-up   Return in about 2 weeks (around 6/3/2025) for Blood pressure recheck with a nurse visit.        Slade Peterson is a 60 year old, presenting for the following health issues:  Hypertension and Recheck Medication        5/20/2025     8:15 AM   Additional Questions   Roomed by Emily SEWELL MA   Accompanied by self         5/20/2025     8:15 AM   Patient Reported Additional Medications   Patient reports taking the following new medications none     History of Present Illness       Hypertension: He presents for follow up of hypertension.  He does not check blood pressure  regularly outside of the clinic. Outpatient blood pressures have not been over 140/90. He follows a low salt diet.     He eats 0-1 servings of fruits and vegetables daily.He consumes 1 sweetened beverage(s) daily.He exercises with enough effort to increase his heart rate 9 or less minutes per day.  He exercises with enough effort to increase his heart rate 3 or less days per week.   He is taking medications regularly.      States he does not take all meds regularly. He has done this on his own. States unexplained fatigue - does not describe further.  Admits to no exercise.  Also c/o joints feeling stiffer than previous years with intermittent aches and pains.  Took all three today, but in last few days has been taking only 1-2 of his meds.    Patient is fasting today.    Chronic Kidney Disease Follow-up    Do you take any over the counter pain medicine?: No     Review of Systems  Constitutional, HEENT, cardiovascular, pulmonary, GI, , musculoskeletal, neuro, skin, endocrine and psych systems are negative, except as otherwise noted.      Objective    BP (!) 160/90   Pulse 100   Temp 97.5  F (36.4  C) (Tympanic)   Resp 20   Ht 1.816 m (5' 11.5\")   Wt 111.1 kg (245 lb)   SpO2 97%   BMI 33.69 kg/m    Body mass index is 33.69 kg/m .  Physical Exam   GENERAL: obese,  alert and no " distress, ambulatory w/o assist  NECK: no tenderness, no adenopathy,  Thyroid not enlarged  RESP: lungs clear to auscultation - no rales, no rhonchi, no wheezes  CV: regular rates and rhythm, no murmur  MS: no edema  SKIN: no suspicious lesions, no rashes  NEURO: strength and tone- normal, sensory exam- grossly normal, mentation- intact, speech- normal, reflexes- symmetric  ABD:  nontender     No results found for any visits on 05/20/25.        Signed Electronically by: Dejuan Cintron MD

## 2025-06-03 ENCOUNTER — ALLIED HEALTH/NURSE VISIT (OUTPATIENT)
Dept: FAMILY MEDICINE | Facility: CLINIC | Age: 60
End: 2025-06-03
Payer: COMMERCIAL

## 2025-06-03 VITALS — HEART RATE: 84 BPM | DIASTOLIC BLOOD PRESSURE: 90 MMHG | SYSTOLIC BLOOD PRESSURE: 148 MMHG

## 2025-06-03 DIAGNOSIS — I10 UNCONTROLLED HYPERTENSION: ICD-10-CM

## 2025-06-03 DIAGNOSIS — I10 ESSENTIAL HYPERTENSION WITH GOAL BLOOD PRESSURE LESS THAN 130/80: Primary | ICD-10-CM

## 2025-06-03 PROCEDURE — 99207 PR NO CHARGE NURSE ONLY: CPT

## 2025-06-03 PROCEDURE — 3077F SYST BP >= 140 MM HG: CPT

## 2025-06-03 PROCEDURE — 3080F DIAST BP >= 90 MM HG: CPT

## 2025-06-03 NOTE — PROGRESS NOTES
Nicholas Martinez is a 60 year old year old patient who comes in today for BP recheck.  Pt was seen 5/20/25 and has resumed his BP medications.      Vital Signs as repeated by RN:  140/94, pulse 84  148/90, rechecked 5 min later.    Patient is taking medication as prescribed  Patient is tolerating medications well.    Patient is not monitoring Blood Pressure at home.  Pt explains that he gets too anxious when he checks home BP's.    Current complaints: slightly red ankle rash bilaterally.  Pt will monitor for now since pt believes the rash is due contact exposure from walking around in his yard over recent days.    Disposition:  Routed to provider for further instructions.    Pt says he took last dose of triamterene-hydrochlorothiazide this morning.  He asks for further instructions via appAttach.    Pharmacy ~ [facility].    Tasha Sharma RN        Detail Level: Detailed Size Of Lesion: 0.7x0.6cm Size Of Lesion: 0.6x0.4cm Size Of Lesion: 0.45x0.35cm Size Of Lesion: 0.4x0.25cm

## 2025-06-21 ENCOUNTER — HEALTH MAINTENANCE LETTER (OUTPATIENT)
Age: 60
End: 2025-06-21

## (undated) DEVICE — GOWN XLG DISP 9545

## (undated) DEVICE — DRSG GAUZE 4X4" TRAY

## (undated) DEVICE — SU ETHILON 4-0 FS-1 1629H

## (undated) DEVICE — DRSG STERI STRIP 1/2X4" R1547

## (undated) DEVICE — PACK HAND

## (undated) DEVICE — ENDO SNARE EXACTO COLD 9MM LOOP 2.4MMX230CM 00711115

## (undated) DEVICE — SOL WATER IRRIG 1000ML BOTTLE 07139-09

## (undated) DEVICE — NDL ANGIOCATH 18GA 1.25" 4055

## (undated) DEVICE — TUBING CYSTO/BLADDER IRRIG SET 80" 06544-01

## (undated) DEVICE — GLOVE PROTEXIS W/NEU-THERA 8.0  2D73TE80

## (undated) DEVICE — CAST PADDING 4" STERILE 9044S

## (undated) DEVICE — BNDG COBAN 4"X5YDS STERILE

## (undated) DEVICE — NDL 22GA 1.5"

## (undated) DEVICE — SYR 10ML FINGER CONTROL W/O NDL 309695

## (undated) DEVICE — SU ETHILON 3-0 FS-1 18" 669H

## (undated) DEVICE — DECANTER VIAL 2006S

## (undated) DEVICE — DRSG KERLIX FLUFFS X5

## (undated) DEVICE — DRSG ADAPTIC 3X8" 6113

## (undated) RX ORDER — ONDANSETRON 2 MG/ML
INJECTION INTRAMUSCULAR; INTRAVENOUS
Status: DISPENSED
Start: 2022-09-20

## (undated) RX ORDER — LIDOCAINE HYDROCHLORIDE 10 MG/ML
INJECTION, SOLUTION EPIDURAL; INFILTRATION; INTRACAUDAL; PERINEURAL
Status: DISPENSED
Start: 2023-01-20

## (undated) RX ORDER — KETOROLAC TROMETHAMINE 30 MG/ML
INJECTION, SOLUTION INTRAMUSCULAR; INTRAVENOUS
Status: DISPENSED
Start: 2022-09-20

## (undated) RX ORDER — LABETALOL HYDROCHLORIDE 5 MG/ML
INJECTION, SOLUTION INTRAVENOUS
Status: DISPENSED
Start: 2022-09-20

## (undated) RX ORDER — PROPOFOL 10 MG/ML
INJECTION, EMULSION INTRAVENOUS
Status: DISPENSED
Start: 2023-01-20

## (undated) RX ORDER — BUPIVACAINE HYDROCHLORIDE AND EPINEPHRINE 5; 5 MG/ML; UG/ML
INJECTION, SOLUTION EPIDURAL; INTRACAUDAL; PERINEURAL
Status: DISPENSED
Start: 2022-09-20

## (undated) RX ORDER — FENTANYL CITRATE 50 UG/ML
INJECTION, SOLUTION INTRAMUSCULAR; INTRAVENOUS
Status: DISPENSED
Start: 2022-09-20

## (undated) RX ORDER — GABAPENTIN 300 MG/1
CAPSULE ORAL
Status: DISPENSED
Start: 2022-09-20

## (undated) RX ORDER — ROPIVACAINE HYDROCHLORIDE 5 MG/ML
INJECTION, SOLUTION EPIDURAL; INFILTRATION; PERINEURAL
Status: DISPENSED
Start: 2022-09-20

## (undated) RX ORDER — ACETAMINOPHEN 325 MG/1
TABLET ORAL
Status: DISPENSED
Start: 2022-09-20

## (undated) RX ORDER — DEXAMETHASONE SODIUM PHOSPHATE 4 MG/ML
INJECTION, SOLUTION INTRA-ARTICULAR; INTRALESIONAL; INTRAMUSCULAR; INTRAVENOUS; SOFT TISSUE
Status: DISPENSED
Start: 2022-09-20

## (undated) RX ORDER — PROPOFOL 10 MG/ML
INJECTION, EMULSION INTRAVENOUS
Status: DISPENSED
Start: 2022-09-20

## (undated) RX ORDER — LIDOCAINE HYDROCHLORIDE 10 MG/ML
INJECTION, SOLUTION EPIDURAL; INFILTRATION; INTRACAUDAL; PERINEURAL
Status: DISPENSED
Start: 2022-09-20